# Patient Record
Sex: FEMALE | Race: WHITE | Employment: OTHER | URBAN - METROPOLITAN AREA
[De-identification: names, ages, dates, MRNs, and addresses within clinical notes are randomized per-mention and may not be internally consistent; named-entity substitution may affect disease eponyms.]

---

## 2017-02-16 ENCOUNTER — OFFICE VISIT (OUTPATIENT)
Dept: OBGYN | Age: 72
End: 2017-02-16

## 2017-02-16 VITALS
SYSTOLIC BLOOD PRESSURE: 130 MMHG | DIASTOLIC BLOOD PRESSURE: 62 MMHG | HEIGHT: 64 IN | WEIGHT: 127 LBS | BODY MASS INDEX: 21.68 KG/M2

## 2017-02-16 DIAGNOSIS — Z46.89 PESSARY MAINTENANCE: Primary | ICD-10-CM

## 2017-02-16 PROCEDURE — 1090F PRES/ABSN URINE INCON ASSESS: CPT | Performed by: OBSTETRICS & GYNECOLOGY

## 2017-02-16 PROCEDURE — G8484 FLU IMMUNIZE NO ADMIN: HCPCS | Performed by: OBSTETRICS & GYNECOLOGY

## 2017-02-16 PROCEDURE — 3014F SCREEN MAMMO DOC REV: CPT | Performed by: OBSTETRICS & GYNECOLOGY

## 2017-02-16 PROCEDURE — 4040F PNEUMOC VAC/ADMIN/RCVD: CPT | Performed by: OBSTETRICS & GYNECOLOGY

## 2017-02-16 PROCEDURE — G8419 CALC BMI OUT NRM PARAM NOF/U: HCPCS | Performed by: OBSTETRICS & GYNECOLOGY

## 2017-02-16 PROCEDURE — 1123F ACP DISCUSS/DSCN MKR DOCD: CPT | Performed by: OBSTETRICS & GYNECOLOGY

## 2017-02-16 PROCEDURE — 3017F COLORECTAL CA SCREEN DOC REV: CPT | Performed by: OBSTETRICS & GYNECOLOGY

## 2017-02-16 PROCEDURE — G8400 PT W/DXA NO RESULTS DOC: HCPCS | Performed by: OBSTETRICS & GYNECOLOGY

## 2017-02-16 PROCEDURE — 4004F PT TOBACCO SCREEN RCVD TLK: CPT | Performed by: OBSTETRICS & GYNECOLOGY

## 2017-02-16 PROCEDURE — G8427 DOCREV CUR MEDS BY ELIG CLIN: HCPCS | Performed by: OBSTETRICS & GYNECOLOGY

## 2017-02-16 PROCEDURE — 99213 OFFICE O/P EST LOW 20 MIN: CPT | Performed by: OBSTETRICS & GYNECOLOGY

## 2017-02-20 ASSESSMENT — ENCOUNTER SYMPTOMS
DIARRHEA: 0
ABDOMINAL PAIN: 0
VOMITING: 0
BLOOD IN STOOL: 0
ANAL BLEEDING: 0
RESPIRATORY NEGATIVE: 1
CONSTIPATION: 0
ALLERGIC/IMMUNOLOGIC NEGATIVE: 1
NAUSEA: 0
ABDOMINAL DISTENTION: 0
RECTAL PAIN: 0
EYES NEGATIVE: 1

## 2017-06-15 ENCOUNTER — OFFICE VISIT (OUTPATIENT)
Dept: OBGYN | Age: 72
End: 2017-06-15

## 2017-06-15 VITALS
DIASTOLIC BLOOD PRESSURE: 62 MMHG | SYSTOLIC BLOOD PRESSURE: 138 MMHG | HEIGHT: 64 IN | WEIGHT: 132 LBS | BODY MASS INDEX: 22.53 KG/M2

## 2017-06-15 DIAGNOSIS — Z46.89 PESSARY MAINTENANCE: Primary | ICD-10-CM

## 2017-06-15 PROCEDURE — 4040F PNEUMOC VAC/ADMIN/RCVD: CPT | Performed by: OBSTETRICS & GYNECOLOGY

## 2017-06-15 PROCEDURE — G8420 CALC BMI NORM PARAMETERS: HCPCS | Performed by: OBSTETRICS & GYNECOLOGY

## 2017-06-15 PROCEDURE — G8427 DOCREV CUR MEDS BY ELIG CLIN: HCPCS | Performed by: OBSTETRICS & GYNECOLOGY

## 2017-06-15 PROCEDURE — 1090F PRES/ABSN URINE INCON ASSESS: CPT | Performed by: OBSTETRICS & GYNECOLOGY

## 2017-06-15 PROCEDURE — 4004F PT TOBACCO SCREEN RCVD TLK: CPT | Performed by: OBSTETRICS & GYNECOLOGY

## 2017-06-15 PROCEDURE — G8400 PT W/DXA NO RESULTS DOC: HCPCS | Performed by: OBSTETRICS & GYNECOLOGY

## 2017-06-15 PROCEDURE — 99213 OFFICE O/P EST LOW 20 MIN: CPT | Performed by: OBSTETRICS & GYNECOLOGY

## 2017-06-15 PROCEDURE — 3017F COLORECTAL CA SCREEN DOC REV: CPT | Performed by: OBSTETRICS & GYNECOLOGY

## 2017-06-15 PROCEDURE — 3014F SCREEN MAMMO DOC REV: CPT | Performed by: OBSTETRICS & GYNECOLOGY

## 2017-06-15 PROCEDURE — 1123F ACP DISCUSS/DSCN MKR DOCD: CPT | Performed by: OBSTETRICS & GYNECOLOGY

## 2017-06-15 ASSESSMENT — ENCOUNTER SYMPTOMS
NAUSEA: 0
CONSTIPATION: 0
ABDOMINAL DISTENTION: 0
ANAL BLEEDING: 0
RESPIRATORY NEGATIVE: 1
ALLERGIC/IMMUNOLOGIC NEGATIVE: 1
VOMITING: 0
EYES NEGATIVE: 1
RECTAL PAIN: 0
DIARRHEA: 0
ABDOMINAL PAIN: 0
BLOOD IN STOOL: 0

## 2017-07-20 ENCOUNTER — OFFICE VISIT (OUTPATIENT)
Dept: OBGYN | Age: 72
End: 2017-07-20

## 2017-07-20 VITALS
WEIGHT: 134 LBS | DIASTOLIC BLOOD PRESSURE: 74 MMHG | BODY MASS INDEX: 22.88 KG/M2 | HEIGHT: 64 IN | SYSTOLIC BLOOD PRESSURE: 126 MMHG

## 2017-07-20 DIAGNOSIS — Z12.31 SCREENING MAMMOGRAM, ENCOUNTER FOR: ICD-10-CM

## 2017-07-20 DIAGNOSIS — Z11.51 SPECIAL SCREENING EXAMINATION FOR HUMAN PAPILLOMAVIRUS (HPV): ICD-10-CM

## 2017-07-20 DIAGNOSIS — Z78.0 POSTMENOPAUSAL: ICD-10-CM

## 2017-07-20 DIAGNOSIS — Z01.419 WELL WOMAN EXAM: Primary | ICD-10-CM

## 2017-07-20 PROCEDURE — G0101 CA SCREEN;PELVIC/BREAST EXAM: HCPCS | Performed by: OBSTETRICS & GYNECOLOGY

## 2017-07-20 ASSESSMENT — ENCOUNTER SYMPTOMS
DIARRHEA: 0
ABDOMINAL PAIN: 0
CONSTIPATION: 0
RESPIRATORY NEGATIVE: 1
EYES NEGATIVE: 1
NAUSEA: 0
RECTAL PAIN: 0
BLOOD IN STOOL: 0
VOMITING: 0
ABDOMINAL DISTENTION: 0
ALLERGIC/IMMUNOLOGIC NEGATIVE: 1
ANAL BLEEDING: 0

## 2017-07-21 LAB — PAP SMEAR: NORMAL

## 2017-07-31 DIAGNOSIS — Z11.51 SPECIAL SCREENING EXAMINATION FOR HUMAN PAPILLOMAVIRUS (HPV): ICD-10-CM

## 2017-07-31 DIAGNOSIS — Z01.419 WELL WOMAN EXAM: ICD-10-CM

## 2018-05-22 RX ORDER — CONJUGATED ESTROGENS 0.62 MG/G
0.5 CREAM VAGINAL
Qty: 30 G | Refills: 1 | Status: SHIPPED | OUTPATIENT
Start: 2018-05-24

## 2018-07-24 ENCOUNTER — TELEPHONE (OUTPATIENT)
Dept: OBGYN CLINIC | Age: 73
End: 2018-07-24

## 2020-12-25 ENCOUNTER — APPOINTMENT (OUTPATIENT)
Dept: GENERAL RADIOLOGY | Age: 75
End: 2020-12-25
Payer: MEDICARE

## 2020-12-25 ENCOUNTER — APPOINTMENT (OUTPATIENT)
Dept: CT IMAGING | Age: 75
End: 2020-12-25
Payer: MEDICARE

## 2020-12-25 ENCOUNTER — HOSPITAL ENCOUNTER (EMERGENCY)
Age: 75
Discharge: HOME | End: 2020-12-25
Attending: EMERGENCY MEDICINE
Payer: MEDICARE

## 2020-12-25 VITALS
BODY MASS INDEX: 20.49 KG/M2 | WEIGHT: 120 LBS | HEART RATE: 84 BPM | SYSTOLIC BLOOD PRESSURE: 172 MMHG | HEIGHT: 64 IN | DIASTOLIC BLOOD PRESSURE: 90 MMHG | OXYGEN SATURATION: 96 % | TEMPERATURE: 98.5 F | RESPIRATION RATE: 18 BRPM

## 2020-12-25 LAB
BACTERIA: ABNORMAL /HPF
BILIRUBIN URINE: NEGATIVE
BLOOD, URINE: ABNORMAL
CLARITY: ABNORMAL
COLOR: YELLOW
EPITHELIAL CELLS, UA: ABNORMAL /HPF
GLUCOSE URINE: NEGATIVE MG/DL
KETONES, URINE: 15 MG/DL
LEUKOCYTE ESTERASE, URINE: ABNORMAL
NITRITE, URINE: POSITIVE
PH UA: 6 (ref 5–9)
PROTEIN UA: 30 MG/DL
RENAL EPITHELIAL, UA: ABNORMAL /HPF
SPECIFIC GRAVITY UA: >=1.03 (ref 1–1.03)
URINE REFLEX TO CULTURE: YES
UROBILINOGEN, URINE: 0.2 E.U./DL
WBC UA: ABNORMAL /HPF (ref 0–5)

## 2020-12-25 PROCEDURE — 73502 X-RAY EXAM HIP UNI 2-3 VIEWS: CPT

## 2020-12-25 PROCEDURE — 87077 CULTURE AEROBIC IDENTIFY: CPT

## 2020-12-25 PROCEDURE — 99285 EMERGENCY DEPT VISIT HI MDM: CPT

## 2020-12-25 PROCEDURE — 87186 SC STD MICRODIL/AGAR DIL: CPT

## 2020-12-25 PROCEDURE — 6370000000 HC RX 637 (ALT 250 FOR IP): Performed by: EMERGENCY MEDICINE

## 2020-12-25 PROCEDURE — 81001 URINALYSIS AUTO W/SCOPE: CPT

## 2020-12-25 PROCEDURE — 87086 URINE CULTURE/COLONY COUNT: CPT

## 2020-12-25 PROCEDURE — 70450 CT HEAD/BRAIN W/O DYE: CPT

## 2020-12-25 RX ORDER — NITROFURANTOIN 25; 75 MG/1; MG/1
100 CAPSULE ORAL ONCE
Status: COMPLETED | OUTPATIENT
Start: 2020-12-25 | End: 2020-12-25

## 2020-12-25 RX ORDER — NITROFURANTOIN 25; 75 MG/1; MG/1
100 CAPSULE ORAL 2 TIMES DAILY
Qty: 20 CAPSULE | Refills: 0 | Status: SHIPPED | OUTPATIENT
Start: 2020-12-25 | End: 2021-01-04

## 2020-12-25 RX ORDER — CLONIDINE HYDROCHLORIDE 0.1 MG/1
0.1 TABLET ORAL ONCE
Status: COMPLETED | OUTPATIENT
Start: 2020-12-25 | End: 2020-12-25

## 2020-12-25 RX ORDER — CLONIDINE HYDROCHLORIDE 0.1 MG/1
0.1 TABLET ORAL ONCE
Status: DISCONTINUED | OUTPATIENT
Start: 2020-12-25 | End: 2020-12-25

## 2020-12-25 RX ADMIN — CLONIDINE HYDROCHLORIDE 0.1 MG: 0.1 TABLET ORAL at 14:16

## 2020-12-25 RX ADMIN — NITROFURANTOIN (MONOHYDRATE/MACROCRYSTALS) 100 MG: 75; 25 CAPSULE ORAL at 14:57

## 2020-12-25 ASSESSMENT — ENCOUNTER SYMPTOMS
CHOKING: 0
SINUS PRESSURE: 0
SORE THROAT: 0
EYE PAIN: 0
CONSTIPATION: 0
EYE DISCHARGE: 0
VOMITING: 0
STRIDOR: 0
TROUBLE SWALLOWING: 0
ABDOMINAL PAIN: 0
FACIAL SWELLING: 0
COUGH: 0
EYE REDNESS: 0
CHEST TIGHTNESS: 0
VOICE CHANGE: 0
WHEEZING: 0
DIARRHEA: 0
BACK PAIN: 0
SHORTNESS OF BREATH: 0
BLOOD IN STOOL: 0

## 2020-12-25 NOTE — ED PROVIDER NOTES
[facility] \[facility]\"" ED  eMERGENCY dEPARTMENT eNCOUnter      Pt Name: Bello Dubois  MRN: [id number]  [place] 1945  Date of evaluation: 12/25/2020  Provider: MD Darren Valdez       Chief Complaint   Patient presents with    Leg Pain     Left leg. Laura Gentile in bathroom last night, found in bathroom with left leg behind her. HISTORY OF PRESENT ILLNESS   (Location/Symptom, Timing/Onset,Context/Setting, Quality, Duration, Modifying Factors, Severity)  Note limiting factors. Bello Dubois is a 77 y.o. female who presents to the emergency department patient status post remote stroke with left-sided weakness but able to recover and walk with walker as per family members history of anxiety depression COPD hypertension hypercholesterolemia arthritis lives by herself as per daughter she found in the bathroom for how long she was there but not sure also noted to be slightly confused for the last few days time as per daughter no bleeding patient denies any pain anywhere patient is brought here by the ambulance for further evaluation of the hip injury is unable to get up by herself no fever no chills no chest pain no short of breath, patient denies any headache nausea vomiting as per daughter probably has not taken her blood pressure medication today    HPI    NursingNotes were reviewed. REVIEW OF SYSTEMS    (2-9 systems for level 4, 10 or more for level 5)     Review of Systems   Constitutional: Negative. Negative for activity change and fever. HENT: Negative for congestion, drooling, facial swelling, mouth sores, nosebleeds, sinus pressure, sore throat, trouble swallowing and voice change. Eyes: Negative for pain, discharge, redness and visual disturbance. Respiratory: Negative for cough, choking, chest tightness, shortness of breath, wheezing and stridor. Cardiovascular: Negative for chest pain, palpitations and leg swelling.    Gastrointestinal: Negative for abdominal pain, blood in stool, constipation, diarrhea and vomiting. Endocrine: Negative for cold intolerance, polyphagia and polyuria. Genitourinary: Negative for dysuria, flank pain, frequency, genital sores and urgency. Musculoskeletal: Positive for arthralgias, gait problem and myalgias. Negative for back pain, joint swelling, neck pain and neck stiffness. Skin: Negative for pallor and rash. Neurological: Negative for tremors, seizures, syncope, weakness, numbness and headaches. Hematological: Negative for adenopathy. Does not bruise/bleed easily. Psychiatric/Behavioral: Positive for confusion. Negative for agitation, behavioral problems, hallucinations and sleep disturbance. The patient is not hyperactive. All other systems reviewed and are negative. Except as noted above the remainder of the review of systems was reviewed and negative.        PAST MEDICAL HISTORY     Past Medical History:   Diagnosis Date    Depression     Diverticul disease small and large intestine, no perforati or abscess     Hypercholesterolemia     Hypertension     Osteoarthritis     Psychiatric problem     Unspecified breast disorder     Uterine prolapse          SURGICALHISTORY       Past Surgical History:   Procedure Laterality Date    ABDOMINAL EXPLORATION SURGERY      Pain/ ovarian cysts    APPENDECTOMY      BREAST BIOPSY Left     CHOLECYSTECTOMY      FOREIGN BODY REMOVAL  09/12/2017    DR. Renae Monas    TONSILLECTOMY           CURRENT MEDICATIONS       Discharge Medication List as of 12/25/2020  3:16 PM      CONTINUE these medications which have NOT CHANGED    Details   PREMARIN 0.625 MG/GM vaginal cream Place 0.5 g vaginally twice a week, Vaginal, TWICE WEEKLY Starting Thu 5/24/2018, Disp-30 g, R-1, Normal      tamoxifen (NOLVADEX) 20 MG tablet R-2      Cholecalciferol (VITAMIN D3) 2000 UNITS CAPS Take by mouth      amLODIPine-benazepril (LOTREL) 10-20 MG per capsule Historical Med      predniSONE (Ebbie Teto) 10 MG tablet Historical Med      ADVAIR DISKUS 100-50 MCG/DOSE diskus inhaler Historical Med      buPROPion (WELLBUTRIN SR) 150 MG SR tablet Historical Med      PROVENTIL  (90 BASE) MCG/ACT inhaler Historical Med      atenolol (TENORMIN) 100 MG tablet Historical Med      BUPROPION HCL by Does not apply route. AmLODIPine Besylate (NORVASC PO) Take  by mouth. Aspirin (ECOTRIN PO) Take  by mouth. Albuterol (PROVENTIL IN) Inhale  into the lungs. ALLERGIES     Levofloxacin, Pcn [penicillins], and Trilipix [choline fenofibrate]    FAMILY HISTORY       Family History   Problem Relation Age of Onset    High Blood Pressure Mother     Stroke Mother     Lung Cancer Brother     Breast Cancer Neg Hx     Cancer Neg Hx     Colon Cancer Neg Hx     Diabetes Neg Hx     Eclampsia Neg Hx     Hypertension Neg Hx     Ovarian Cancer Neg Hx      Labor Neg Hx     Spont Abortions Neg Hx           SOCIAL HISTORY       Social History     Socioeconomic History    Marital status:       Spouse name: None    Number of children: None    Years of education: None    Highest education level: None   Occupational History    None   Social Needs    Financial resource strain: None    Food insecurity     Worry: None     Inability: None    Transportation needs     Medical: None     Non-medical: None   Tobacco Use    Smoking status: Current Every Day Smoker     Packs/day: 0.50     Years: 50.00     Pack years: 25.00    Smokeless tobacco: Never Used   Substance and Sexual Activity    Alcohol use: Yes     Comment: social    Drug use: No    Sexual activity: Yes     Partners: Male   Lifestyle    Physical activity     Days per week: None     Minutes per session: None    Stress: None   Relationships    Social connections     Talks on phone: None     Gets together: None     Attends Evangelical service: None     Active member of club or organization: None     Attends meetings of clubs or Pulmonary:      Effort: No respiratory distress. Breath sounds: Normal breath sounds. No stridor. No wheezing or rhonchi. Chest:      Chest wall: No tenderness. Abdominal:      General: Bowel sounds are normal. There is no distension. Palpations: Abdomen is soft. There is no mass. Tenderness: There is no abdominal tenderness. There is no right CVA tenderness, guarding or rebound. Hernia: No hernia is present. Musculoskeletal: Normal range of motion. General: No tenderness or deformity. Right lower leg: No edema. Comments: Patient given to the both extremities patient has no shortening or deformity of the leg patient had no hematoma no tenderness elicited on my examination at this time   Lymphadenopathy:      Cervical: No cervical adenopathy. Skin:     General: Skin is warm. Capillary Refill: Capillary refill takes less than 2 seconds. Coloration: Skin is not jaundiced. Findings: No bruising, erythema or rash. Neurological:      General: No focal deficit present. Mental Status: She is alert and oriented to person, place, and time. Cranial Nerves: No cranial nerve deficit. Sensory: No sensory deficit. Motor: No weakness or abnormal muscle tone. Coordination: Coordination normal.      Gait: Gait normal.      Deep Tendon Reflexes: Reflexes normal.   Psychiatric:         Behavior: Behavior normal.         Thought Content:  Thought content normal.         DIAGNOSTIC RESULTS     EKG: All EKG's are interpreted by the Emergency Department Physician who either signs or Co-signsthis chart in the absence of a cardiologist.        RADIOLOGY:   Non-plain filmimages such as CT, Ultrasound and MRI are read by the radiologist. Plain radiographic images are visualized and preliminarily interpreted by the emergency physician with the below findings:        Interpretation per the Radiologist below, if available at the time ofthis note:    XR HIP 2-3 VW W PELVIS LEFT   Final Result      No acute findings. CT Head WO Contrast   Final Result      Significant limitations from patient motion. Within these limits, no acute intracranial process. ED BEDSIDE ULTRASOUND:   Performed by ED Physician - none    LABS:  Labs Reviewed   URINE RT REFLEX TO CULTURE - Abnormal; Notable for the following components:       Result Value    Ketones, Urine 15 (*)     Protein, UA 30 (*)     All other components within normal limits   MICROSCOPIC URINALYSIS - Abnormal; Notable for the following components:    Bacteria, UA MANY (*)     All other components within normal limits   CULTURE, URINE       All other labs were within normal range or not returned as of this dictation.     EMERGENCY DEPARTMENT COURSE and DIFFERENTIAL DIAGNOSIS/MDM:   Vitals:    Vitals:    12/25/20 1349 12/25/20 1416 12/25/20 1502   BP: (!) 230/108 (!) 180/100 (!) 172/90   Pulse: 86  84   Resp: 18  18   Temp: 98.5 °F (36.9 °C)     TempSrc: Oral     SpO2: 96%  96%   Weight: 120 lb (54.4 kg)     Height: 5' 4\" (1.626 m)           MDM  Number of Diagnoses or Management Options  Acute cystitis without hematuria  Contusion of left lower extremity, initial encounter: established and improving  H/O fall  Diagnosis management comments: Patient complaining of UTI advised to drink plenty of fluids patient is given blood pressure medication and blood pressure coming down patient had no focal deficit moving all extremities very well patient given lunch and she eating lunch at this time no acute distress denies any pain anywhere x-rays of the pelvis performed no fracture CT of the head performed patient has no acute new stroke at this time as per radiologist no bleeding family informed about situation drinking fluid hydration and following precautions family understood very well       Amount and/or Complexity of Data Reviewed  Tests in the radiology section of CPT®: ordered and

## 2020-12-25 NOTE — ED NOTES
Awaiting family member arrival for transport home     [facility]  12/25/20 7615

## 2020-12-25 NOTE — ED TRIAGE NOTES
Pt brought in by EMS. Found on bathroom floor. Unknown how long she has been there. Family reports increased confusion.

## 2020-12-25 NOTE — ED NOTES
Pt found on bathroom floor, unknown how long she had been there, left leg was behind her. . Prior CVA in 2018. Daughter states that pt drags her left leg and uses a walker. Daughter states that pt has had increased confusion today. Hand grasps moderately strong and equal. Plantar flexion and dorsiflexion stronger on right compared to left. Hypertensive. Pt admits to not taking her medications properly. 180/100 manually. Denies headache. Denies neck/back pain.       Carlton Wang, RN  12/25/20 3193

## 2020-12-28 LAB
ORGANISM: ABNORMAL
URINE CULTURE, ROUTINE: ABNORMAL

## 2023-03-27 PROBLEM — N84.0 ENDOMETRIAL POLYP: Status: ACTIVE | Noted: 2023-03-27

## 2023-03-27 PROBLEM — F32.A ANXIETY AND DEPRESSION: Status: ACTIVE | Noted: 2023-03-27

## 2023-03-27 PROBLEM — R93.89 THICKENED ENDOMETRIUM: Status: ACTIVE | Noted: 2023-03-27

## 2023-03-27 PROBLEM — R53.1 WEAKNESS: Status: ACTIVE | Noted: 2023-03-27

## 2023-03-27 PROBLEM — E78.5 HLD (HYPERLIPIDEMIA): Status: ACTIVE | Noted: 2023-03-27

## 2023-03-27 PROBLEM — K44.9 HIATAL HERNIA: Status: ACTIVE | Noted: 2023-03-27

## 2023-03-27 PROBLEM — I25.10 CAD, MULTIPLE VESSEL: Status: ACTIVE | Noted: 2023-03-27

## 2023-03-27 PROBLEM — F41.9 ANXIETY AND DEPRESSION: Status: ACTIVE | Noted: 2023-03-27

## 2023-03-27 PROBLEM — K21.9 GERD (GASTROESOPHAGEAL REFLUX DISEASE): Status: ACTIVE | Noted: 2023-03-27

## 2023-03-27 RX ORDER — TAMOXIFEN CITRATE 20 MG/1
1 TABLET ORAL DAILY
COMMUNITY
Start: 2015-11-29

## 2023-03-27 RX ORDER — ALBUTEROL SULFATE 90 UG/1
AEROSOL, METERED RESPIRATORY (INHALATION)
COMMUNITY

## 2023-03-27 RX ORDER — TRAZODONE HYDROCHLORIDE 150 MG/1
TABLET ORAL
COMMUNITY
Start: 2022-11-21

## 2023-03-27 RX ORDER — CHOLECALCIFEROL (VITAMIN D3) 25 MCG
1 TABLET ORAL DAILY
COMMUNITY

## 2023-03-27 RX ORDER — NITROFURANTOIN 25; 75 MG/1; MG/1
CAPSULE ORAL
COMMUNITY
Start: 2022-12-21

## 2023-03-27 RX ORDER — ATENOLOL 50 MG/1
1 TABLET ORAL DAILY
COMMUNITY

## 2023-03-27 RX ORDER — ATENOLOL 50 MG/1
50 TABLET ORAL
COMMUNITY
Start: 2009-03-27

## 2023-03-27 RX ORDER — ALBUTEROL SULFATE 90 UG/1
2 AEROSOL, METERED RESPIRATORY (INHALATION)
COMMUNITY
Start: 2013-09-10

## 2023-03-27 RX ORDER — AZITHROMYCIN 250 MG/1
TABLET, FILM COATED ORAL
COMMUNITY
Start: 2017-09-01

## 2023-03-27 RX ORDER — ACETAMINOPHEN 500 MG
TABLET ORAL
COMMUNITY

## 2023-03-27 RX ORDER — TALC
POWDER (GRAM) TOPICAL
COMMUNITY

## 2023-03-27 RX ORDER — ONDANSETRON 4 MG/1
TABLET, FILM COATED ORAL
COMMUNITY
Start: 2022-12-21

## 2023-03-27 RX ORDER — PANTOPRAZOLE SODIUM 40 MG/1
TABLET, DELAYED RELEASE ORAL
COMMUNITY
Start: 2022-05-25

## 2023-03-27 RX ORDER — FAMOTIDINE 20 MG/1
20 TABLET, FILM COATED ORAL
COMMUNITY
Start: 2009-03-27

## 2023-03-27 RX ORDER — MIRTAZAPINE 15 MG/1
TABLET, FILM COATED ORAL
COMMUNITY
Start: 2022-12-21

## 2023-03-27 RX ORDER — AMLODIPINE AND BENAZEPRIL HYDROCHLORIDE 10; 20 MG/1; MG/1
1 CAPSULE ORAL
COMMUNITY
Start: 2014-06-16

## 2023-03-27 RX ORDER — VALSARTAN AND HYDROCHLOROTHIAZIDE 160; 12.5 MG/1; MG/1
1 TABLET, FILM COATED ORAL
COMMUNITY
Start: 2009-03-27

## 2023-03-27 RX ORDER — ASPIRIN 325 MG
325 TABLET, DELAYED RELEASE (ENTERIC COATED) ORAL
COMMUNITY

## 2023-03-27 RX ORDER — ATORVASTATIN CALCIUM 40 MG/1
1 TABLET, FILM COATED ORAL DAILY
COMMUNITY
Start: 2021-01-02

## 2023-03-27 RX ORDER — BUPROPION HYDROCHLORIDE 150 MG/1
150 TABLET, EXTENDED RELEASE ORAL 2 TIMES DAILY
COMMUNITY
Start: 2013-07-05

## 2023-03-27 RX ORDER — FLUTICASONE PROPIONATE AND SALMETEROL 100; 50 UG/1; UG/1
1 POWDER RESPIRATORY (INHALATION) 2 TIMES DAILY
COMMUNITY
Start: 2013-06-15

## 2023-03-27 RX ORDER — ALBUTEROL SULFATE 0.83 MG/ML
SOLUTION RESPIRATORY (INHALATION)
COMMUNITY
Start: 2023-01-16

## 2023-03-27 RX ORDER — TRAZODONE HYDROCHLORIDE 100 MG/1
TABLET ORAL
COMMUNITY
Start: 2022-12-21

## 2023-03-27 RX ORDER — SUCRALFATE 1 G/1
TABLET ORAL
COMMUNITY
Start: 2023-02-19

## 2023-03-27 RX ORDER — NAPROXEN SODIUM 220 MG/1
1 TABLET, FILM COATED ORAL DAILY
COMMUNITY

## 2023-03-27 RX ORDER — BUPROPION HYDROCHLORIDE 100 MG/1
TABLET, EXTENDED RELEASE ORAL
COMMUNITY
Start: 2022-12-21

## 2023-03-27 RX ORDER — CLOPIDOGREL BISULFATE 75 MG/1
1 TABLET ORAL DAILY
COMMUNITY
Start: 2019-07-27

## 2023-03-27 RX ORDER — TAMOXIFEN CITRATE 20 MG/1
20 TABLET ORAL
COMMUNITY
Start: 2017-07-28

## 2023-03-27 RX ORDER — LIDOCAINE 50 MG/G
PATCH TOPICAL
COMMUNITY
Start: 2022-06-07

## 2023-03-27 RX ORDER — FENOFIBRATE 145 MG/1
145 TABLET, FILM COATED ORAL
COMMUNITY
Start: 2009-03-27

## 2023-03-27 RX ORDER — TRAZODONE HYDROCHLORIDE 50 MG/1
TABLET ORAL
COMMUNITY
Start: 2022-09-22

## 2023-03-27 RX ORDER — ATENOLOL 100 MG/1
100 TABLET ORAL
COMMUNITY
Start: 2013-02-28

## 2023-03-27 RX ORDER — DONEPEZIL HYDROCHLORIDE 5 MG/1
TABLET, FILM COATED ORAL
COMMUNITY
Start: 2022-11-21

## 2023-03-27 RX ORDER — AMLODIPINE BESYLATE 100 %
POWDER (GRAM) MISCELLANEOUS
COMMUNITY

## 2023-03-27 RX ORDER — ACETAMINOPHEN 500 MG
TABLET ORAL EVERY 8 HOURS PRN
COMMUNITY
Start: 2021-08-08

## 2023-03-27 RX ORDER — PREDNISONE 10 MG/1
TABLET ORAL
COMMUNITY
Start: 2014-06-03

## 2023-03-27 RX ORDER — ONDANSETRON 4 MG/1
TABLET, ORALLY DISINTEGRATING ORAL EVERY 8 HOURS PRN
COMMUNITY
Start: 2021-08-08

## 2023-04-18 ENCOUNTER — NURSING HOME VISIT (OUTPATIENT)
Dept: POST ACUTE CARE | Facility: EXTERNAL LOCATION | Age: 78
End: 2023-04-18
Payer: MEDICARE

## 2023-04-18 VITALS
HEIGHT: 64 IN | DIASTOLIC BLOOD PRESSURE: 90 MMHG | HEART RATE: 97 BPM | BODY MASS INDEX: 20.35 KG/M2 | TEMPERATURE: 98.2 F | SYSTOLIC BLOOD PRESSURE: 153 MMHG | WEIGHT: 119.2 LBS | RESPIRATION RATE: 20 BRPM

## 2023-04-18 DIAGNOSIS — F41.9 ANXIETY AND DEPRESSION: ICD-10-CM

## 2023-04-18 DIAGNOSIS — I25.10 CAD, MULTIPLE VESSEL: ICD-10-CM

## 2023-04-18 DIAGNOSIS — I10 HTN (HYPERTENSION), BENIGN: Primary | ICD-10-CM

## 2023-04-18 DIAGNOSIS — Z51.5 HOSPICE CARE PATIENT: ICD-10-CM

## 2023-04-18 DIAGNOSIS — F32.A ANXIETY AND DEPRESSION: ICD-10-CM

## 2023-04-18 DIAGNOSIS — M19.90 OSTEOARTHRITIS, UNSPECIFIED OSTEOARTHRITIS TYPE, UNSPECIFIED SITE: ICD-10-CM

## 2023-04-18 PROCEDURE — 99308 SBSQ NF CARE LOW MDM 20: CPT | Performed by: NURSE PRACTITIONER

## 2023-04-22 PROBLEM — Z51.5 HOSPICE CARE PATIENT: Status: ACTIVE | Noted: 2023-04-22

## 2024-01-16 ENCOUNTER — NURSING HOME VISIT (OUTPATIENT)
Dept: POST ACUTE CARE | Facility: EXTERNAL LOCATION | Age: 79
End: 2024-01-16
Payer: MEDICARE

## 2024-01-16 DIAGNOSIS — J06.9 UPPER RESPIRATORY INFECTION WITH COUGH AND CONGESTION: Primary | ICD-10-CM

## 2024-01-16 DIAGNOSIS — F01.53: ICD-10-CM

## 2024-01-16 DIAGNOSIS — J44.9 CHRONIC OBSTRUCTIVE PULMONARY DISEASE, UNSPECIFIED COPD TYPE (MULTI): ICD-10-CM

## 2024-01-16 PROCEDURE — 99309 SBSQ NF CARE MODERATE MDM 30: CPT | Performed by: NURSE PRACTITIONER

## 2024-01-16 NOTE — LETTER
"Patient: Eli Tay  : 1945    Encounter Date: 2024    Name: Eli Tay  YOB: 1945    ACUTE VISIT: LTC, fever, cough    SUBJECTIVE:  Nursing reported that Eli is not feeling well - fever and cough. Patient is resting in bed and opens eyes when name is called. She does not appear to be in acute distress but does appear to fatigue and generally not feeling well. Patient c/o cough and feeling tired. Covid 19 test done and negative. Will have nursing obtain Flu and RSV swab. Unfort, these tests need to be sent out. CXR to be ordered as patient has Rhonchi bilaterally. Patient does have low grade fever of 99.0    REVIEW OF SYSTEMS:   All review of systems are negative unless otherwise stated above under subjective.    LABS REVIEWED AT FACILITY:  CXR 24 - Negative  Flu and RSV sent out    MEDICATIONS REVIEWED AT FACILITY:  Zpak as directed  Prednisone 40 mg daily x 5 days  Albuterol nebs Q6hr PRN  Tums as needed  Imodium as needed  Lidocaine patch to bilateral hips and lower back as needed  Tylenol 650 mg every 4 hours as needed  Colace 1 tab twice daily as needed  Trazodone 125 mg nightly  Melatonin 10 mg nightly    Living will related issues reviewed-[facility]    OBJECTIVE:  /84   Pulse 96   Temp 37.1 °C (98.8 °F)   Resp 18   Ht 1.625 m (5' 3.97\")   Wt 58.9 kg (129 lb 12.8 oz)   SpO2 96%   BMI 22.30 kg/m²     Physical Exam  Constitutional:       Comments: Chronically ill, fatigue, pale  HENT:      Head: Normocephalic.      Mouth/Throat:      Mouth: Mucous membranes are moist.   Eyes:      Pupils: Pupils are equal, round, and reactive to light.   Cardiovascular:      Rate and Rhythm: Normal rate and regular rhythm.   Pulmonary:      Effort: Pulmonary effort is normal.      Breath sounds: Rhonchi bilateral  Abdominal:      General: Abdomen is flat. Bowel sounds are normal.      Palpations: Abdomen is soft.   Genitourinary:     Comments: Genitalia " not examined  Skin:     General: Skin is warm and dry.   Neurological:      General: No focal deficit present.      Mental Status: She is alert.      Comments: Oriented x1   Psychiatric:         Mood and Affect: Mood normal.      Assessment/Plan  Problem List Items Addressed This Visit       Vascular dementia with depressed mood (CMS/HCC)    Chronic obstructive pulmonary disease (CMS/HCC)    Upper respiratory infection with cough and congestion - Primary       Skin integrity:  Nursing to monitor skin integrity as patient is at risk for pressure injuries.  Please monitor skin integrity and other pressure areas  Referral to wound clinic if appropriate:    PLAN:  Pt has been seen for an Acute visit.  The patient resides in a Long term care facility.  Patient was formerly in Hospice but no longer meeting criteria. She is in the locked Dementia unit.  Recent nursing evaluation and notes were reviewed.   Overall, the patient is experiencing the following:  #Upper resp illness, h/o COPD: ?Flu ?RSV, Covid negative, CXR negative, Zpak, Prednisone and Albuterol nebs ordered. Tylenol as needed for fever.  Psych adjusted meds (trazodone) for depression as well as Melatonin for insomnia.   #Dementia: redirect as needed   Any decline or change in condition needs to be communicated with the physician or myself.    Discussion with nursing staff regarding ongoing care and management.  If needed, would communicate with family who are not present at this time.   There are no concerns at this time.  We will continue with the medications noted above.    We will continue to follow the patient here at the facility.    Discharge planning: no plan for discharge, LTC resident, rehab potential    *Please note that nursing facility, outside laboratory agency, and  [facility] do not interface.     Completion of the note was done through Dragon voice recognition technology and may include   unintended or grammatical errors which may not have been  recognized when finalizing the note.     Time:    MORE Madison      Electronically Signed By: MORE Madison   1/23/24  7:14 PM

## 2024-01-22 VITALS
TEMPERATURE: 98.8 F | BODY MASS INDEX: 22.16 KG/M2 | OXYGEN SATURATION: 96 % | DIASTOLIC BLOOD PRESSURE: 84 MMHG | HEIGHT: 64 IN | SYSTOLIC BLOOD PRESSURE: 107 MMHG | WEIGHT: 129.8 LBS | RESPIRATION RATE: 18 BRPM | HEART RATE: 96 BPM

## 2024-01-22 NOTE — PROGRESS NOTES
"Name: Eli Tay  YOB: 1945    ACUTE VISIT: LTC, fever, cough    SUBJECTIVE:  Nursing reported that Eli is not feeling well - fever and cough. Patient is resting in bed and opens eyes when name is called. She does not appear to be in acute distress but does appear to fatigue and generally not feeling well. Patient c/o cough and feeling tired. Covid 19 test done and negative. Will have nursing obtain Flu and RSV swab. Unfort, these tests need to be sent out. CXR to be ordered as patient has Rhonchi bilaterally. Patient does have low grade fever of 99.0    REVIEW OF SYSTEMS:   All review of systems are negative unless otherwise stated above under subjective.    LABS REVIEWED AT FACILITY:  CXR 1/16/24 - Negative  Flu and RSV sent out    MEDICATIONS REVIEWED AT FACILITY:  Zpak as directed  Prednisone 40 mg daily x 5 days  Albuterol nebs Q6hr PRN  Tums as needed  Imodium as needed  Lidocaine patch to bilateral hips and lower back as needed  Tylenol 650 mg every 4 hours as needed  Colace 1 tab twice daily as needed  Trazodone 125 mg nightly  Melatonin 10 mg nightly    [facility] issues [facility]    OBJECTIVE:  /84   Pulse 96   Temp 37.1 °C (98.8 °F)   Resp 18   Ht 1.625 m (5' 3.97\")   Wt 58.9 kg (129 lb 12.8 oz)   SpO2 96%   BMI 22.30 kg/m²     Physical Exam  Constitutional:       Comments: Chronically ill, fatigue, pale  HENT:      Head: Normocephalic.      Mouth/Throat:      Mouth: Mucous membranes are moist.   Eyes:      Pupils: Pupils are equal, round, and reactive to light.   Cardiovascular:      Rate and Rhythm: Normal rate and regular rhythm.   Pulmonary:      Effort: Pulmonary effort is normal.      Breath sounds: Rhonchi bilateral  Abdominal:      General: Abdomen is flat. Bowel sounds are normal.      Palpations: Abdomen is soft.   Genitourinary:     Comments: Genitalia not examined  Skin:     General: Skin is warm and dry.   Neurological:      " General: No focal deficit present.      Mental Status: She is alert.      Comments: Oriented x1   Psychiatric:         Mood and Affect: Mood normal.      Assessment/Plan   Problem List Items Addressed This Visit       Vascular dementia with depressed mood (CMS/HCC)    Chronic obstructive pulmonary disease (CMS/HCC)    Upper respiratory infection with cough and congestion - Primary       Skin integrity:  Nursing to monitor skin integrity as patient is at risk for pressure injuries.  Please monitor skin integrity and other pressure areas  Referral to wound clinic if appropriate:    PLAN:  Pt has been seen for an Acute visit.  The patient resides in a Long term care facility.  Patient was formerly in Hospice but no longer meeting criteria. She is in the locked Dementia unit.  Recent nursing evaluation and notes were reviewed.   Overall, the patient is experiencing the following:  #Upper resp illness, h/o COPD: ?Flu ?RSV, Covid negative, CXR negative, Zpak, Prednisone and Albuterol nebs ordered. Tylenol as needed for fever.  Psych adjusted meds (trazodone) for depression as well as Melatonin for insomnia.   #Dementia: redirect as needed   Any decline or change in condition needs to be communicated with the physician or myself.    Discussion with nursing staff regarding ongoing care and management.  If needed, would communicate with family who are not present at this time.   There are no concerns at this time.  We will continue with the medications noted above.    We will continue to follow the patient here at the facility.    Discharge planning: no plan for discharge, LTC resident, rehab potential    *Please note that nursing facility, outside laboratory agency, and  [facility] do not interface.     Completion of the note was done through Dragon voice recognition technology and may include   unintended or grammatical errors which may not have been recognized when finalizing the note.     Time:    Luisa Rivas,  APRN-CNP

## 2024-01-23 ENCOUNTER — NURSING HOME VISIT (OUTPATIENT)
Dept: POST ACUTE CARE | Facility: EXTERNAL LOCATION | Age: 79
End: 2024-01-23
Payer: MEDICARE

## 2024-01-23 DIAGNOSIS — J06.9 UPPER RESPIRATORY INFECTION WITH COUGH AND CONGESTION: Primary | ICD-10-CM

## 2024-01-23 DIAGNOSIS — F01.53: ICD-10-CM

## 2024-01-23 PROBLEM — Z51.5 HOSPICE CARE PATIENT: Status: RESOLVED | Noted: 2023-04-22 | Resolved: 2024-01-23

## 2024-01-23 PROCEDURE — 99309 SBSQ NF CARE MODERATE MDM 30: CPT | Performed by: NURSE PRACTITIONER

## 2024-01-23 NOTE — LETTER
"Patient: Eli Tay  : 1945    Encounter Date: 2024    Name: Eli Tay  YOB: 1945    Follow up VISIT: LTC, f/up on URI and labs    SUBJECTIVE:  The patient appears to be doing better. She is not having fevers or chills. Per nursing less coughing and appears to be feeling better. Covid, RSV, and Flu negative - resulted on 24. Patient is pleasantly confused and not able to have a meaningful conversation.    REVIEW OF SYSTEMS:   All review of systems are negative unless otherwise stated above under subjective.    LABS REVIEWED AT FACILITY:  1/15/24 sent out sample and resulted on 24  RSV negative  Covid negative  Flu negative    MEDICATIONS REVIEWED AT FACILITY:    Living will related issues reviewed-Code status: [facility]    OBJECTIVE:  /84   Pulse 96   Temp 36.4 °C (97.6 °F)   Resp 18   Ht 1.625 m (5' 3.97\")   Wt 58.9 kg (129 lb 12.8 oz)   SpO2 96%   BMI 22.30 kg/m²     Physical Exam  Constitutional:       Comments: Chronically ill  HENT:      Head: Normocephalic.      Mouth/Throat:      Mouth: Mucous membranes are moist.   Eyes:      Pupils: Pupils are equal, round, and reactive to light.   Cardiovascular:      Rate and Rhythm: Normal rate and regular rhythm.   Pulmonary:      Effort: Pulmonary effort is normal.      Breath sounds: Normal breath sounds  Abdominal:      General: Abdomen is flat. Bowel sounds are normal.      Palpations: Abdomen is soft.   Genitourinary:     Comments: Genitalia not examined  Skin:     General: Skin is warm and dry.   Neurological:      General: No focal deficit present.      Mental Status: She is alert.      Comments: Oriented x1   Psychiatric:         Mood and Affect: Mood normal.     Assessment/Plan  Problem List Items Addressed This Visit       Vascular dementia with depressed mood (CMS/HCC)    Upper respiratory infection with cough and congestion - Primary       Skin integrity:  Nursing to monitor skin integrity as " patient is at risk for pressure injuries.    PLAN:  Pt has been seen for an follow up visit.  The patient resides in a Long term care facility.  Recent nursing evaluation and notes were reviewed.   Overall, the patient is experiencing the following:  #URI: Feeling better, residual cough, ATB and steroids completed.   #Dementia: no behaviors, redirect as needed  Any decline or change in condition needs to be communicated with the physician or myself.    Discussion with nursing staff regarding ongoing care and management.  If needed, would communicate with family who are not present at this time.   There are no concerns at this time.  We will continue with the medications noted above.    We will continue to follow the patient here at the facility.    Discharge planning: no plan for discharge, LTC resident, rehab potential    *Please note that nursing facility, outside laboratory agency, and [facility] do not interface.     Completion of the note was done through Dragon voice recognition technology and may include   unintended or grammatical errors which may not have been recognized when finalizing the note.     Time:    MORE Madison      Electronically Signed By: MORE Madison   2/1/24  1:05 PM

## 2024-01-27 VITALS
TEMPERATURE: 97.6 F | SYSTOLIC BLOOD PRESSURE: 107 MMHG | HEIGHT: 64 IN | HEART RATE: 96 BPM | BODY MASS INDEX: 22.16 KG/M2 | OXYGEN SATURATION: 96 % | DIASTOLIC BLOOD PRESSURE: 84 MMHG | WEIGHT: 129.8 LBS | RESPIRATION RATE: 18 BRPM

## 2024-01-27 NOTE — PROGRESS NOTES
"Name: Eli Tay  YOB: 1945    Follow up VISIT: LTC, f/up on URI and labs    SUBJECTIVE:  The patient appears to be doing better. She is not having fevers or chills. Per nursing less coughing and appears to be feeling better. Covid, RSV, and Flu negative - resulted on 1/20/24. Patient is pleasantly confused and not able to have a meaningful conversation.    REVIEW OF SYSTEMS:   All review of systems are negative unless otherwise stated above under subjective.    LABS REVIEWED AT FACILITY:  1/15/24 sent out sample and resulted on 1/20/24  RSV negative  Covid negative  Flu negative    MEDICATIONS REVIEWED AT FACILITY:    [facility]    OBJECTIVE:  /84   Pulse 96   Temp 36.4 °C (97.6 °F)   Resp 18   Ht 1.625 m (5' 3.97\")   Wt 58.9 kg (129 lb 12.8 oz)   SpO2 96%   BMI 22.30 kg/m²     Physical Exam  Constitutional:       Comments: Chronically ill  HENT:      Head: Normocephalic.      Mouth/Throat:      Mouth: Mucous membranes are moist.   Eyes:      Pupils: Pupils are equal, round, and reactive to light.   Cardiovascular:      Rate and Rhythm: Normal rate and regular rhythm.   Pulmonary:      Effort: Pulmonary effort is normal.      Breath sounds: Normal breath sounds  Abdominal:      General: Abdomen is flat. Bowel sounds are normal.      Palpations: Abdomen is soft.   Genitourinary:     Comments: Genitalia not examined  Skin:     General: Skin is warm and dry.   Neurological:      General: No focal deficit present.      Mental Status: She is alert.      Comments: Oriented x1   Psychiatric:         Mood and Affect: Mood normal.     Assessment/Plan   Problem List Items Addressed This Visit       Vascular dementia with depressed mood (CMS/HCC)    Upper respiratory infection with cough and congestion - Primary       Skin integrity:  Nursing to monitor skin integrity as patient is at risk for pressure injuries.    PLAN:  Pt has been seen for an " follow up visit.  The patient resides in a Long term care facility.  Recent nursing evaluation and notes were reviewed.   Overall, the patient is experiencing the following:  #URI: Feeling better, residual cough, ATB and steroids completed.   #Dementia: no behaviors, redirect as needed  Any decline or change in condition needs to be communicated with the physician or myself.    Discussion with nursing staff regarding ongoing care and management.  If needed, would communicate with family who are not present at this time.   There are no concerns at this time.  We will continue with the medications noted above.    We will continue to follow the patient here at the facility.    Discharge planning: no plan for discharge, LTC resident, rehab potential    *Please note that nursing facility, outside laboratory agency, and  [facility] do not interface.     Completion of the note was done through Dragon voice recognition technology and may include   unintended or grammatical errors which may not have been recognized when finalizing the note.     Time:    Luisa Rivas, DARLIN-CNP

## 2024-08-02 ENCOUNTER — NURSING HOME VISIT (OUTPATIENT)
Dept: POST ACUTE CARE | Facility: EXTERNAL LOCATION | Age: 79
End: 2024-08-02
Payer: COMMERCIAL

## 2024-08-02 DIAGNOSIS — F01.53: ICD-10-CM

## 2024-08-02 DIAGNOSIS — M79.605 LEFT LEG PAIN: Primary | ICD-10-CM

## 2024-08-02 DIAGNOSIS — L89.621 PRESSURE INJURY OF LEFT HEEL, STAGE 1: ICD-10-CM

## 2024-08-02 DIAGNOSIS — R53.1 WEAKNESS: ICD-10-CM

## 2024-08-02 PROCEDURE — 99309 SBSQ NF CARE MODERATE MDM 30: CPT | Performed by: NURSE PRACTITIONER

## 2024-08-02 NOTE — LETTER
Patient: Eli Tay  : 1945    Encounter Date: 2024    Name: Eli Tay  YOB: 1945    ACUTE VISIT: LTC, Swelling pain to left leg, left heel wound    SUBJECTIVE:  The patient is 79 yr old female who is a LTC resident here at [facility]. She has developed unilateral swelling and pain to left leg. Duplex US done  and negative for DVT. She does have a left heel wound as of  that is being seen by wound nurse here. Left foot is painful with exam. X-ray was done to left hip and knee on  and that was negative.   Eli has a h/o dementia but is able to answer questions appropriately. She denies CP or SOB. She reports pain to left lower leg.     REVIEW OF SYSTEMS:   All review of systems are negative unless otherwise stated above under subjective.    LABS REVIEWED AT FACILITY:  7/15/24  WBC 13.5  CMP normal  Uric Acid 7.1    Allergies   Allergen Reactions   • Fenofibrate Anaphylaxis   • Fenofibric Acid Anaphylaxis   • Fenofibric Acid (Choline) Anaphylaxis   • Levofloxacin Anaphylaxis and Unknown   • Penicillins Anaphylaxis, Hives and Unknown       MEDICATIONS REVIEWED AT FACILITY:  Please see Nursing facility Medication EMR for full updated list.    Living will related issues reviewed-Code status: DNRCC    OBJECTIVE:  There were no vitals taken for this visit.  Physical Exam  Constitutional:       Appearance: Normal appearance. She is normal weight.   HENT:      Head: Normocephalic.      Right Ear: External ear normal.      Left Ear: External ear normal.      Nose: Nose normal.      Mouth/Throat:      Mouth: Mucous membranes are moist.   Eyes:      Extraocular Movements: Extraocular movements intact.      Conjunctiva/sclera: Conjunctivae normal.      Pupils: Pupils are equal, round, and reactive to light.   Cardiovascular:      Rate and Rhythm: Normal rate and regular rhythm.      Pulses: Normal pulses.      Heart sounds: Normal heart sounds.   Pulmonary:      Effort:  Pulmonary effort is normal.      Breath sounds: Normal breath sounds.   Abdominal:      General: Bowel sounds are normal. There is no distension.      Palpations: Abdomen is soft.      Tenderness: There is no abdominal tenderness.   Genitourinary:     Comments: Not examined  Musculoskeletal:         General: Tenderness present. Normal range of motion.      Cervical back: Normal range of motion and neck supple.      Comments: Generalized weakness   Skin:     General: Skin is warm and dry.      Capillary Refill: Capillary refill takes less than 2 seconds.      Comments: Left heel pressure injury   Neurological:      General: No focal deficit present.      Mental Status: She is alert. Mental status is at baseline.      Comments: Oriented x1   Psychiatric:      Comments: demented         Assessment/Plan  Problem List Items Addressed This Visit       Vascular dementia with depressed mood (Multi)    Weakness    Left leg pain - Primary    Pressure injury of left heel, stage 1       Skin integrity:  Nursing to monitor skin integrity as patient is at risk for pressure injuries.  Wound care per nursing  Left heel  See Facility notes for measurements/assessment of wound.  Turn and reposition Q 2 hours or more.  Air mattress and when up in chair cushion reducing device.  Dietician to evaluate and recommend.  Nutritional supplement to be implemented if needed.  Please monitor skin integrity and other pressure areas.  Referral to wound clinic or wound team in facility to follow if appropriate.    PLAN:  Pt has been seen for an Acute visit.  The patient resides in a Long term care facility.  Recent nursing evaluation and notes were reviewed.   #Swelling and Pain to left lower leg: Duplex US negative  X-ray negative, Left heel pressure injury. No edema noted today.  Tylenol for pain  Lidocaine pain    #Pressure injury: Wound nurse following.     #Dementia/Insomnia: Redirect and maintain safe environment, Trazodone started  7/29/24    Any decline or change in condition needs to be communicated with the physician or myself.    Discussion with nursing staff regarding ongoing care and management.  Communication regarding patients status, overall condition, changes with plan (medications or treatments), and any questions from family completed if present.  If family not available, would communicate in person or via phone if needed.  We will continue with the medications noted above.    We will continue to follow the patient here at the facility.    Discharge planning: no plan for discharge, LTC resident, rehab potential    *Please note that nursing facility, outside laboratory agency, and [facility] do not interface.     Completion of the note was done through Dragon voice recognition technology and may include   unintended or grammatical errors which may not have been recognized when finalizing the note.     Time:    MORE Madison    Electronically Signed By: MORE Madison   8/6/24 12:36 PM

## 2024-08-06 PROBLEM — L89.621 PRESSURE INJURY OF LEFT HEEL, STAGE 1: Status: ACTIVE | Noted: 2024-08-06

## 2024-08-06 PROBLEM — M79.605 LEFT LEG PAIN: Status: ACTIVE | Noted: 2024-08-06

## 2024-08-06 NOTE — PROGRESS NOTES
Name: Eli Tay  YOB: 1945    ACUTE VISIT: LTC, Swelling pain to left leg, left heel wound    SUBJECTIVE:  The patient is 79 yr old female who is a LTC resident here at [facility]. She has developed unilateral swelling and pain to left leg. Duplex US done 7/31 and negative for DVT. She does have a left heel wound as of 7/30 that is being seen by wound nurse here. Left foot is painful with exam. X-ray was done to left hip and knee on 7/14 and that was negative.   Eli has a h/o dementia but is able to answer questions appropriately. She denies CP or SOB. She reports pain to left lower leg.     REVIEW OF SYSTEMS:   All review of systems are negative unless otherwise stated above under subjective.    LABS REVIEWED AT FACILITY:  7/15/24  WBC 13.5  CMP normal  Uric Acid 7.1    Allergies   Allergen Reactions    Fenofibrate Anaphylaxis    Fenofibric Acid Anaphylaxis    Fenofibric Acid (Choline) Anaphylaxis    Levofloxacin Anaphylaxis and Unknown    Penicillins Anaphylaxis, Hives and Unknown       MEDICATIONS REVIEWED AT FACILITY:  Please see Nursing facility Medication EMR for full updated list.    Living will related issues reviewed-Code status: DNC    OBJECTIVE:  There were no vitals taken for this visit.  Physical Exam  Constitutional:       Appearance: Normal appearance. She is normal weight.   HENT:      Head: Normocephalic.      Right Ear: External ear normal.      Left Ear: External ear normal.      Nose: Nose normal.      Mouth/Throat:      Mouth: Mucous membranes are moist.   Eyes:      Extraocular Movements: Extraocular movements intact.      Conjunctiva/sclera: Conjunctivae normal.      Pupils: Pupils are equal, round, and reactive to light.   Cardiovascular:      Rate and Rhythm: Normal rate and regular rhythm.      Pulses: Normal pulses.      Heart sounds: Normal heart sounds.   Pulmonary:      Effort: Pulmonary effort is normal.      Breath sounds: Normal breath sounds.    Abdominal:      General: Bowel sounds are normal. There is no distension.      Palpations: Abdomen is soft.      Tenderness: There is no abdominal tenderness.   Genitourinary:     Comments: Not examined  Musculoskeletal:         General: Tenderness present. Normal range of motion.      Cervical back: Normal range of motion and neck supple.      Comments: Generalized weakness   Skin:     General: Skin is warm and dry.      Capillary Refill: Capillary refill takes less than 2 seconds.      Comments: Left heel pressure injury   Neurological:      General: No focal deficit present.      Mental Status: She is alert. Mental status is at baseline.      Comments: Oriented x1   Psychiatric:      Comments: demented         Assessment/Plan   Problem List Items Addressed This Visit       Vascular dementia with depressed mood (Multi)    Weakness    Left leg pain - Primary    Pressure injury of left heel, stage 1       Skin integrity:  Nursing to monitor skin integrity as patient is at risk for pressure injuries.  Wound care per nursing  Left heel  See Facility notes for measurements/assessment of wound.  Turn and reposition Q 2 hours or more.  Air mattress and when up in chair cushion reducing device.  Dietician to evaluate and recommend.  Nutritional supplement to be implemented if needed.  Please monitor skin integrity and other pressure areas.  Referral to wound clinic or wound team in facility to follow if appropriate.    PLAN:  Pt has been seen for an Acute visit.  The patient resides in a Long term care facility.  Recent nursing evaluation and notes were reviewed.   #Swelling and Pain to left lower leg: Duplex US negative  X-ray negative, Left heel pressure injury. No edema noted today.  Tylenol for pain  Lidocaine pain    #Pressure injury: Wound nurse following.     #Dementia/Insomnia: Redirect and maintain safe environment, Trazodone started 7/29/24    Any decline or change in condition needs to be communicated with  the physician or myself.    Discussion with nursing staff regarding ongoing care and management.  Communication regarding patients status, overall condition, changes with plan (medications or treatments), and any questions from family completed if present.  If family not available, would communicate in person or via phone if needed.  We will continue with the medications noted above.    We will continue to follow the patient here at the facility.    Discharge planning: no plan for discharge, LTC resident, rehab potential    *Please note that nursing facility, outside laboratory agency, and  [facility] do not interface.     Completion of the note was done through Dragon voice recognition technology and may include   unintended or grammatical errors which may not have been recognized when finalizing the note.     Time:    DARLIN Madison-CNP

## 2025-01-10 ENCOUNTER — NURSING HOME VISIT (OUTPATIENT)
Dept: POST ACUTE CARE | Facility: EXTERNAL LOCATION | Age: 80
End: 2025-01-10
Payer: MEDICARE

## 2025-01-10 DIAGNOSIS — E78.5 HYPERLIPIDEMIA, UNSPECIFIED HYPERLIPIDEMIA TYPE: ICD-10-CM

## 2025-01-10 DIAGNOSIS — M19.90 OSTEOARTHRITIS, UNSPECIFIED OSTEOARTHRITIS TYPE, UNSPECIFIED SITE: ICD-10-CM

## 2025-01-10 DIAGNOSIS — F32.A ANXIETY AND DEPRESSION: ICD-10-CM

## 2025-01-10 DIAGNOSIS — E78.00 HYPERCHOLESTEREMIA: ICD-10-CM

## 2025-01-10 DIAGNOSIS — F01.53 VASCULAR DEMENTIA WITH DEPRESSED MOOD: Primary | ICD-10-CM

## 2025-01-10 DIAGNOSIS — I25.10 CAD, MULTIPLE VESSEL: ICD-10-CM

## 2025-01-10 DIAGNOSIS — F41.9 ANXIETY AND DEPRESSION: ICD-10-CM

## 2025-01-10 DIAGNOSIS — I10 HTN (HYPERTENSION), BENIGN: ICD-10-CM

## 2025-01-10 DIAGNOSIS — J44.9 CHRONIC OBSTRUCTIVE PULMONARY DISEASE, UNSPECIFIED COPD TYPE (MULTI): ICD-10-CM

## 2025-01-10 PROCEDURE — 99308 SBSQ NF CARE LOW MDM 20: CPT | Performed by: NURSE PRACTITIONER

## 2025-01-10 NOTE — LETTER
Patient: Eli Tay  : 1945    Encounter Date: 01/10/2025    Name: Eli Tay  YOB: 1945    MONTHLY VISIT: Long term care resident    SUBJECTIVE:  The patient is a 79 yr old female who is a LTC resident here at [facility]. The patient transitioned here to the facility after a skilled visit in .   PMH of CAD, status post PTCA, hyperlipidemia, COPD, anxiety and depression, hypertension, TIA, and dementia.  The resident was under Hospice care for a few months in  and this was cancelled in 2023.  At this time the patient is sitting up in her wheelchair she appears to be comfortable and in no acute distress patient is confused due to her dementia but is able to communicate.  She complains of lower leg pain.  Nursing reports no concerns at this time.  Patient is seen regularly by the facility physician.  The patient is a DNR CC.    REVIEW OF SYSTEMS:  All review of systems are negative unless otherwise stated above under subjective.    LABS REVIEWED AT FACILITY:  24  CMP-COMPREHENSIVE METABOLIC PNL JIMBO  GLUCOSE 85 mg/dL  GLUCOSE, FASTING 65-99 mg/dL  GLUCOSE, NON-FASTING  mg/dL  SODIUM 142 136-145 mEq/L  POTASSIUM 3.7 3.5-5.3 mEq/L  CHLORIDE 101  mEq/L  CARBON DIOXIDE (CO2) 24 21-33 mEq/L  BUN (UREA NITROGEN) 11 7-25 mg/dL  CREATININE 0.6 0.6-1.2 mg/dL  BUN/CREATININE RATIO 18 6-22  GFR-AFRICAN AMERICAN 117 >60 mL/min/1.73 m2  GFR-NON-AFRICAN AMERICAN 97 >60 mL/min/1.73 m2   Stage of CKD eGFR (mL/min/1.73 square meters)   Stage 1 >/= 90 or > 90   Stage 2 60 - 89   Stage 3 30 - 59   Stage 4 15 - 29   Stage 5 </= 14 or < 15  ** GFR is reliable for adults 17 to 69 years with stable kidney   function.  CALCIUM 8.6 8.4-10.2 mg/dL  PROTEIN, TOTAL 5.8~ L 6.0-8.3 g/dL  ALBUMIN 3.4~ L 3.5-5.5 g/dL  A/G RATIO 1.4 1.0-2.3  ALKALINE PHOS 77  IU/L  AST (SGOT) 14 4-40 IU/L  ALT (SGPT) 8 4-55 IU/L  BILIRUBIN, TOTAL 0.5 0.2-1.2 mg/dL  WBC 8.9, H&H  "14/42.3 and platelets 333    7/15/24  URIC ACID 7.1 2.3-7.6 mg/dL    3/20/24  TSH 3-UL 3.692 0.340-5.500 uIU/mL JIMBO    7/19/2021  VITAMIN B12 221 211-911 pg/mL  LIPID PROFILE w/Reflex Direct LDL JIMBO  CHOLESTEROL 133 <200 mg/dL  TRIGLYCERIDES 251~ H <150 mg/dL  HDL 41~ L >50 mg/dL  LDLc 42 <100  LDLc/HDL RATIO 1.0 <4:1  VLDLc 50~ H 5-40 mg/dL    Allergies   Allergen Reactions   • Fenofibrate Anaphylaxis   • Fenofibric Acid Anaphylaxis   • Fenofibric Acid (Choline) Anaphylaxis   • Levofloxacin Anaphylaxis and Unknown   • Penicillins Anaphylaxis, Hives and Unknown       MEDICATIONS REVIEWED AT FACILITY:  Please see nursing facility medication list.    Living will related issues reviewed-Code status: DNC    OBJECTIVE:  /87   Pulse 90   Temp 36.3 °C (97.3 °F)   Resp 18   Ht 1.626 m (5' 4\")   Wt 58.5 kg (128 lb 14.4 oz)   SpO2 96% Comment: RA  BMI 22.13 kg/m²   Physical Exam  Constitutional:       Appearance: Normal appearance. She is normal weight.   HENT:      Head: Normocephalic.      Mouth: Mucous membranes are moist.   Eyes:      Extraocular Movements: Extraocular movements intact.      Conjunctiva/sclera: Conjunctivae normal.      Pupils: Pupils are equal, round, and reactive to light.   Cardiovascular:      Rate and Rhythm: Normal rate and regular rhythm.      Pulses: Normal pulses.      Heart sounds: Normal heart sounds.   Pulmonary:      Effort: Pulmonary effort is normal.      Breath sounds: Normal breath sounds.   Abdominal:      General: Bowel sounds are normal. There is no distension.      Palpations: Abdomen is soft.      Tenderness: There is no abdominal tenderness.   Genitourinary:     Comments: Not examined  Musculoskeletal:         General: Tenderness present. Normal range of motion.      Cervical back: Normal range of motion and neck supple.      Comments: Generalized weakness   Skin:     General: Skin is warm and dry.      Capillary Refill: Capillary refill takes less than 2 seconds. " Right ankle dressing in place  Neurological:      General: No focal deficit present.      Mental Status: She is alert. Mental status is at baseline.      Comments: Oriented x1   Psychiatric:      Comments: demented            Assessment/Plan  Problem List Items Addressed This Visit       Anxiety and depression    Vascular dementia with depressed mood - Primary    CAD, multiple vessel    Chronic obstructive pulmonary disease (Multi)    HLD (hyperlipidemia)    Hypercholesteremia    HTN (hypertension), benign    Osteoarthritis       Skin integrity:  Nursing to monitor skin integrity as patient is at risk for pressure injuries.  Turn and reposition Q 2 hours or more.  Air mattress and when up in chair cushion reducing device.  Dietician to evaluate and recommend.  Nutritional supplement to be implemented if needed.  Please monitor skin integrity and other pressure areas.    Wound Care :  Wound care per nursing  Right lateral malleolus cleanse with normal saline pat dry apply skin prep to wound bed cover with ABD and Kerlix Monday Wednesday Friday and as needed  Referral to Wound clinic or Wound Nurse Practitioner if appropriate.      PLAN:  Pt has been seen for monthly visit.    The patient is confined to a long term care facility.   Recent nursing evaluation and notes were reviewed.     Dementia, depression, insomnia:  Continue to monitor mood and behaviors.  Mood: calm   Behavior: no behaviors reported  Monitor Weights:  1/2/25: 128.9# (stable)  Psych following.  Continue with medications:  Trazodone 50 mg nightly  Redirect as needed.  Provide safe environment and continue frequent monitoring.  Resident resides in a locked unit.    HTN, CAD, HLD:   Monitor and follow BP readings.   Continue Medications:  No meds  Labs to be done intermittently and adjust meds as needed.  BP readings reviewed:  160/80s, recheck 112/58    Avoid nephrotoxic drugs.    COPD:  Monitor resp status   Oxygen as needed to maintain SPO2 92% or  better  Albuterol nebulizer every 6 hours as needed    Osteoarthritis/generalized discomfort:  Tylenol 650 mg daily  Tylenol 650 mg every 4 hours as needed  Lidocaine patch to lower back topically 12 hours on and 12 hours off as needed    Any decline or change in condition needs to be communicated with the physician or myself.    Discussion with nursing staff regarding ongoing care and management.  Communication regarding patients status, overall condition, changes with plan (medications or treatments), and any questions from family completed if present.  If family not available, would communicate in person or via phone if needed.  We will continue with the plan and medications noted above.    We will continue to follow the patient here at the facility.    Discharge planning:   No plan for discharge, LTC resident, rehab potential    *Please note that nursing facility notes, facility EMR, outside laboratory agency, and  EMR do not interface.     Completion of the note was done through Dragon voice recognition technology and may include unintended or grammatical errors which may not have been recognized when finalizing the note.     Time:    MORE Madison      Electronically Signed By: MORE Madison   1/29/25  3:02 PM

## 2025-01-29 VITALS
HEIGHT: 64 IN | SYSTOLIC BLOOD PRESSURE: 161 MMHG | RESPIRATION RATE: 18 BRPM | TEMPERATURE: 97.3 F | HEART RATE: 90 BPM | OXYGEN SATURATION: 96 % | DIASTOLIC BLOOD PRESSURE: 87 MMHG | WEIGHT: 128.9 LBS | BODY MASS INDEX: 22.01 KG/M2

## 2025-01-29 NOTE — PROGRESS NOTES
Name: Eli Tay  YOB: 1945    MONTHLY VISIT: Long term care resident    SUBJECTIVE:  The patient is a 79 yr old female who is a LTC resident here at [facility]. The patient transitioned here to the facility after a skilled visit in Jan of 2021.   PMH of CAD, status post PTCA, hyperlipidemia, COPD, anxiety and depression, hypertension, TIA, and dementia.  The resident was under Hospice care for a few months in 2023 and this was cancelled in July 2023.  At this time the patient is sitting up in her wheelchair she appears to be comfortable and in no acute distress patient is confused due to her dementia but is able to communicate.  She complains of lower leg pain.  Nursing reports no concerns at this time.  Patient is seen regularly by the facility physician.  The patient is a DNR CC.    REVIEW OF SYSTEMS:  All review of systems are negative unless otherwise stated above under subjective.    LABS REVIEWED AT FACILITY:  12/30/24  CMP-COMPREHENSIVE METABOLIC PNL JIMBO  GLUCOSE 85 mg/dL  GLUCOSE, FASTING 65-99 mg/dL  GLUCOSE, NON-FASTING  mg/dL  SODIUM 142 136-145 mEq/L  POTASSIUM 3.7 3.5-5.3 mEq/L  CHLORIDE 101  mEq/L  CARBON DIOXIDE (CO2) 24 21-33 mEq/L  BUN (UREA NITROGEN) 11 7-25 mg/dL  CREATININE 0.6 0.6-1.2 mg/dL  BUN/CREATININE RATIO 18 6-22  GFR-AFRICAN AMERICAN 117 >60 mL/min/1.73 m2  GFR-NON-AFRICAN AMERICAN 97 >60 mL/min/1.73 m2   Stage of CKD eGFR (mL/min/1.73 square meters)   Stage 1 >/= 90 or > 90   Stage 2 60 - 89   Stage 3 30 - 59   Stage 4 15 - 29   Stage 5 </= 14 or < 15  ** GFR is reliable for adults 17 to 69 years with stable kidney   function.  CALCIUM 8.6 8.4-10.2 mg/dL  PROTEIN, TOTAL 5.8~ L 6.0-8.3 g/dL  ALBUMIN 3.4~ L 3.5-5.5 g/dL  A/G RATIO 1.4 1.0-2.3  ALKALINE PHOS 77  IU/L  AST (SGOT) 14 4-40 IU/L  ALT (SGPT) 8 4-55 IU/L  BILIRUBIN, TOTAL 0.5 0.2-1.2 mg/dL  WBC 8.9, H&H 14/42.3 and platelets 333    7/15/24  URIC ACID 7.1 2.3-7.6  "mg/dL    3/20/24  TSH 3-UL 3.692 0.340-5.500 uIU/mL JIMBO    7/19/2021  VITAMIN B12 221 211-911 pg/mL  LIPID PROFILE w/Reflex Direct LDL JIMBO  CHOLESTEROL 133 <200 mg/dL  TRIGLYCERIDES 251~ H <150 mg/dL  HDL 41~ L >50 mg/dL  LDLc 42 <100  LDLc/HDL RATIO 1.0 <4:1  VLDLc 50~ H 5-40 mg/dL    Allergies   Allergen Reactions    Fenofibrate Anaphylaxis    Fenofibric Acid Anaphylaxis    Fenofibric Acid (Choline) Anaphylaxis    Levofloxacin Anaphylaxis and Unknown    Penicillins Anaphylaxis, Hives and Unknown       MEDICATIONS REVIEWED AT FACILITY:  Please see nursing facility medication list.    Living will related issues reviewed-Code status: DNRCC    OBJECTIVE:  /87   Pulse 90   Temp 36.3 °C (97.3 °F)   Resp 18   Ht 1.626 m (5' 4\")   Wt 58.5 kg (128 lb 14.4 oz)   SpO2 96% Comment: RA  BMI 22.13 kg/m²   Physical Exam  Constitutional:       Appearance: Normal appearance. She is normal weight.   HENT:      Head: Normocephalic.      Mouth: Mucous membranes are moist.   Eyes:      Extraocular Movements: Extraocular movements intact.      Conjunctiva/sclera: Conjunctivae normal.      Pupils: Pupils are equal, round, and reactive to light.   Cardiovascular:      Rate and Rhythm: Normal rate and regular rhythm.      Pulses: Normal pulses.      Heart sounds: Normal heart sounds.   Pulmonary:      Effort: Pulmonary effort is normal.      Breath sounds: Normal breath sounds.   Abdominal:      General: Bowel sounds are normal. There is no distension.      Palpations: Abdomen is soft.      Tenderness: There is no abdominal tenderness.   Genitourinary:     Comments: Not examined  Musculoskeletal:         General: Tenderness present. Normal range of motion.      Cervical back: Normal range of motion and neck supple.      Comments: Generalized weakness   Skin:     General: Skin is warm and dry.      Capillary Refill: Capillary refill takes less than 2 seconds. Right ankle dressing in place  Neurological:      General: No focal " deficit present.      Mental Status: She is alert. Mental status is at baseline.      Comments: Oriented x1   Psychiatric:      Comments: demented            Assessment/Plan   Problem List Items Addressed This Visit       Anxiety and depression    Vascular dementia with depressed mood - Primary    CAD, multiple vessel    Chronic obstructive pulmonary disease (Multi)    HLD (hyperlipidemia)    Hypercholesteremia    HTN (hypertension), benign    Osteoarthritis       Skin integrity:  Nursing to monitor skin integrity as patient is at risk for pressure injuries.  Turn and reposition Q 2 hours or more.  Air mattress and when up in chair cushion reducing device.  Dietician to evaluate and recommend.  Nutritional supplement to be implemented if needed.  Please monitor skin integrity and other pressure areas.    Wound Care :  Wound care per nursing  Right lateral malleolus cleanse with normal saline pat dry apply skin prep to wound bed cover with ABD and Kerlix Monday Wednesday Friday and as needed  Referral to Wound clinic or Wound Nurse Practitioner if appropriate.      PLAN:  Pt has been seen for monthly visit.    The patient is confined to a long term care facility.   Recent nursing evaluation and notes were reviewed.     Dementia, depression, insomnia:  Continue to monitor mood and behaviors.  Mood: calm   Behavior: no behaviors reported  Monitor Weights:  1/2/25: 128.9# (stable)  Psych following.  Continue with medications:  Trazodone 50 mg nightly  Redirect as needed.  Provide safe environment and continue frequent monitoring.  Resident resides in a locked unit.    HTN, CAD, HLD:   Monitor and follow BP readings.   Continue Medications:  No meds  Labs to be done intermittently and adjust meds as needed.  BP readings reviewed:  160/80s, recheck 112/58    Avoid nephrotoxic drugs.    COPD:  Monitor resp status   Oxygen as needed to maintain SPO2 92% or better  Albuterol nebulizer every 6 hours as  needed    Osteoarthritis/generalized discomfort:  Tylenol 650 mg daily  Tylenol 650 mg every 4 hours as needed  Lidocaine patch to lower back topically 12 hours on and 12 hours off as needed    Any decline or change in condition needs to be communicated with the physician or myself.    Discussion with nursing staff regarding ongoing care and management.  Communication regarding patients status, overall condition, changes with plan (medications or treatments), and any questions from family completed if present.  If family not available, would communicate in person or via phone if needed.  We will continue with the plan and medications noted above.    We will continue to follow the patient here at the facility.    Discharge planning:   No plan for discharge, LTC resident, rehab potential    *Please note that nursing facility notes, facility EMR, outside laboratory agency, and  EMR do not interface.     Completion of the note was done through Dragon voice recognition technology and may include unintended or grammatical errors which may not have been recognized when finalizing the note.     Time:    Luisa Rivas, APRN-CNP

## 2025-02-21 ENCOUNTER — NURSING HOME VISIT (OUTPATIENT)
Dept: POST ACUTE CARE | Facility: EXTERNAL LOCATION | Age: 80
End: 2025-02-21
Payer: MEDICARE

## 2025-02-21 DIAGNOSIS — F32.A ANXIETY AND DEPRESSION: ICD-10-CM

## 2025-02-21 DIAGNOSIS — J44.9 CHRONIC OBSTRUCTIVE PULMONARY DISEASE, UNSPECIFIED COPD TYPE (MULTI): ICD-10-CM

## 2025-02-21 DIAGNOSIS — R53.1 WEAKNESS: ICD-10-CM

## 2025-02-21 DIAGNOSIS — M79.604 PAIN IN BOTH LOWER EXTREMITIES: Primary | ICD-10-CM

## 2025-02-21 DIAGNOSIS — I10 HTN (HYPERTENSION), BENIGN: ICD-10-CM

## 2025-02-21 DIAGNOSIS — F41.9 ANXIETY AND DEPRESSION: ICD-10-CM

## 2025-02-21 DIAGNOSIS — F01.53 VASCULAR DEMENTIA WITH DEPRESSED MOOD: ICD-10-CM

## 2025-02-21 DIAGNOSIS — M62.838 MUSCLE SPASMS OF BOTH LOWER EXTREMITIES: ICD-10-CM

## 2025-02-21 DIAGNOSIS — M79.605 PAIN IN BOTH LOWER EXTREMITIES: Primary | ICD-10-CM

## 2025-02-21 DIAGNOSIS — M19.90 OSTEOARTHRITIS, UNSPECIFIED OSTEOARTHRITIS TYPE, UNSPECIFIED SITE: ICD-10-CM

## 2025-02-21 NOTE — LETTER
"Patient: Eli Tay  : 1945    Encounter Date: 2025    Name: Eli Tay  YOB: 1945    ACUTE VISIT: Long term care resident    SUBJECTIVE:  Nursing reports that patient is having severe pain to lower legs.  Patient is resting in bed. She has a h/o dementia and not able to have a meaningful conversation. Legs are folded up and appear contracted. Legs stretched out by me and patient yells out, \"you bitch\".     REVIEW OF SYSTEMS:   All review of systems are negative unless otherwise stated above under subjective.    Allergies   Allergen Reactions   • Fenofibrate Anaphylaxis   • Fenofibric Acid Anaphylaxis   • Fenofibric Acid (Choline) Anaphylaxis   • Levofloxacin Anaphylaxis and Unknown   • Penicillins Anaphylaxis, Hives and Unknown       MEDICATIONS REVIEWED AT FACILITY:  Please see nursing facility medication list.    [facility]    OBJECTIVE:  /54   Physical Exam  Constitutional:       Appearance: chronically ill appearing. She is normal weight.   HENT:      Head: Normocephalic.      Mouth: Mucous membranes are moist.   Eyes:      Extraocular Movements: Extraocular movements intact.      Conjunctiva/sclera: Conjunctivae normal.      Pupils: Pupils are equal, round, and reactive to light.   Cardiovascular:      Rate and Rhythm: Normal rate and regular rhythm.      Pulses: Normal pulses.      Heart sounds: Normal heart sounds.   Pulmonary:      Effort: Pulmonary effort is normal.      Breath sounds: Normal breath sounds.   Abdominal:      General: Bowel sounds are normal. There is no distension.      Palpations: Abdomen is soft.      Tenderness: There is no abdominal tenderness.   Genitourinary:     Comments: Not examined  Musculoskeletal:         General: Tenderness present. Normal range of motion.      Cervical back: Normal range of motion and neck supple.      Comments: Generalized weakness, both legs tight and " painful  Skin:     General: Skin is warm and dry.      Capillary Refill: Capillary refill takes less than 2 seconds.   Neurological:      General: No focal deficit present.      Mental Status: She is alert. Mental status is at baseline.      Comments: Oriented x1   Psychiatric:      Comments: demented     Assessment/Plan  Problem List Items Addressed This Visit       Anxiety and depression    Vascular dementia with depressed mood    Chronic obstructive pulmonary disease (Multi)    HTN (hypertension), benign    Osteoarthritis    Weakness     Other Visit Diagnoses       Pain in both lower extremities    -  Primary    Muscle spasms of both lower extremities                Skin integrity:  Nursing to monitor skin integrity as patient is at risk for pressure injuries.  Turn and reposition Q 2 hours or more.  Air mattress and when up in chair cushion reducing device.  Dietician to evaluate and recommend.  Nutritional supplement to be implemented if needed.  Please monitor skin integrity and other pressure areas.    PLAN:  Pt has been seen for an Acute visit.  The patient resides in a Long term care facility.  Recent nursing evaluation and notes were reviewed.     Leg pain, spasms, Osteoarthritis/generalized discomfort:  Tylenol 650 mg daily  Tylenol 650 mg every 4 hours as needed  Lidocaine patch to lower back topically 12 hours on and 12 hours off as needed  Increase Cymbalta to 60 mg daily  Start Baclofen 5 mg TID  Start Oxycodone 5 mg Q12 hr PRN x 10 days    Dementia, depression, insomnia:  Continue to monitor mood and behaviors.  Mood: calm   Behavior: no behaviors reported  Monitor Weights:  1/2/25: 128.9# (stable)  2/5/25: 129.6#  Psych following.  Continue with medications:  Trazodone 50 mg nightly  Cymbalta increased to 60 mg daily  Redirect as needed.  Provide safe environment and continue frequent monitoring.  Resident resides in a locked unit.     HTN, CAD, HLD:   Monitor and follow BP readings.   Continue  Medications:  No meds  Labs to be done intermittently and adjust meds as needed.  BP readings reviewed:  130/50s  Avoid nephrotoxic drugs.     COPD:  Monitor resp status - stable  Oxygen as needed to maintain SPO2 92% or better  Albuterol nebulizer every 6 hours as needed         Any decline or change in condition needs to be communicated with the physician or myself.    Discussion with nursing staff regarding ongoing care and management.  Communication regarding patients status, overall condition, changes with plan (medications or treatments), and any questions from family completed if present.  If family not available, would communicate in person or via phone if needed.  We will continue with the medications noted above.    We will continue to follow the patient here at the facility.    Discharge planning:   No plan for discharge, LTC resident, rehab potential    *Please note that nursing facility notes, facility EMR, outside laboratory agency, and  EMR do not interface.     Completion of the note was done through Dragon voice recognition technology and may include unintended or grammatical errors which may not have been recognized when finalizing the note.     Time:    MORE Madison      Electronically Signed By: MORE Madison   3/21/25  8:33 PM

## 2025-02-28 ENCOUNTER — NURSING HOME VISIT (OUTPATIENT)
Dept: POST ACUTE CARE | Facility: EXTERNAL LOCATION | Age: 80
End: 2025-02-28
Payer: MEDICARE

## 2025-02-28 DIAGNOSIS — G89.29 OTHER CHRONIC PAIN: ICD-10-CM

## 2025-02-28 DIAGNOSIS — M62.838 MUSCLE SPASMS OF BOTH LOWER EXTREMITIES: Primary | ICD-10-CM

## 2025-02-28 DIAGNOSIS — M79.605 PAIN IN BOTH LOWER EXTREMITIES: ICD-10-CM

## 2025-02-28 DIAGNOSIS — M79.604 PAIN IN BOTH LOWER EXTREMITIES: ICD-10-CM

## 2025-02-28 DIAGNOSIS — F32.A ANXIETY AND DEPRESSION: ICD-10-CM

## 2025-02-28 DIAGNOSIS — F41.9 ANXIETY AND DEPRESSION: ICD-10-CM

## 2025-02-28 DIAGNOSIS — M19.90 OSTEOARTHRITIS, UNSPECIFIED OSTEOARTHRITIS TYPE, UNSPECIFIED SITE: ICD-10-CM

## 2025-02-28 PROCEDURE — 99309 SBSQ NF CARE MODERATE MDM 30: CPT | Performed by: NURSE PRACTITIONER

## 2025-02-28 NOTE — LETTER
"Patient: Eli Tay  : 1945    Encounter Date: 2025    Name: Eli Tay  YOB: 1945    FOLLOW UP VISIT: Long term care resident    SUBJECTIVE:  The patient was recently seen for an acute visit on  for muscle spasms and severe pain in lower legs. She was started on Baclofen 5 mg TID, Oxy 5 mg Q12 hr PRN x 10 days, and Cymbalta was increased to 60 mg daily.   Nursing reports that patient is more lethargic and \"not acting right\". Baclofen d'cd.   Pat is currently resting in bed. She appears to be much more comfortable than my previous visit. She is awake. Baclofen was d'cd . Nursing reports patient is more herself w/Baclofen d'cd.   Patient is confused and not able to have a meaningful conversation.     REVIEW OF SYSTEMS:   All review of systems are negative unless otherwise stated above under subjective.    Allergies   Allergen Reactions   • Fenofibrate Anaphylaxis   • Fenofibric Acid Anaphylaxis   • Fenofibric Acid (Choline) Anaphylaxis   • Levofloxacin Anaphylaxis and Unknown   • Penicillins Anaphylaxis, Hives and Unknown       MEDICATIONS REVIEWED AT FACILITY:  Please see nursing facility medication list.    Living will related issues reviewed-Code status: DNC    OBJECTIVE:  /54   Pulse 68   Physical Exam  Constitutional:       Appearance: chronically ill appearing. She is normal weight.   HENT:      Head: Normocephalic.      Mouth: Mucous membranes are moist.   Eyes:      Extraocular Movements: Extraocular movements intact.      Conjunctiva/sclera: Conjunctivae normal.      Pupils: Pupils are equal, round, and reactive to light.   Cardiovascular:      Rate and Rhythm: Normal rate and regular rhythm.      Pulses: Normal pulses.      Heart sounds: Normal heart sounds.   Pulmonary:      Effort: Pulmonary effort is normal.      Breath sounds: Normal breath sounds.   Abdominal:      General: Bowel sounds are normal. There is no distension.      Palpations: " Abdomen is soft.      Tenderness: There is no abdominal tenderness.   Genitourinary:     Comments: Not examined  Musculoskeletal:         General: Tenderness present. Normal range of motion.      Cervical back: Normal range of motion and neck supple.      Comments: Generalized weakness, lower legs tight but can be extended out straight  Skin:     General: Skin is warm and dry.      Capillary Refill: Capillary refill takes less than 2 seconds.   Neurological:      General: No focal deficit present.      Mental Status: She is alert. Mental status is at baseline.      Comments: Oriented x1   Psychiatric:      Comments: demented        Assessment/Plan  Problem List Items Addressed This Visit       Anxiety and depression    Osteoarthritis     Other Visit Diagnoses       Muscle spasms of both lower extremities    -  Primary    Pain in both lower extremities        Other chronic pain                Skin integrity:  Nursing to monitor skin integrity as patient is at risk for pressure injuries.  Turn and reposition Q 2 hours or more.  Air mattress and when up in chair cushion reducing device.  Dietician to evaluate and recommend.  Nutritional supplement to be implemented if needed.  Please monitor skin integrity and other pressure areas.    PLAN:  Pt has been seen for an follow up visit - see HPI.  The patient resides in a Long term care facility.  Recent nursing evaluation and notes were reviewed.     Leg pain, spasms, Osteoarthritis/generalized discomfort:  Tylenol 650 mg daily  Tylenol 650 mg every 4 hours as needed  Lidocaine patch to lower back topically 12 hours on and 12 hours off as needed  Increase Cymbalta to 60 mg daily  D'cd Baclofen 5 mg TID - too much at this time for patient  Start Oxycodone 5 mg Q12 hr PRN x 10 days     Dementia, depression, insomnia:  Continue to monitor mood and behaviors.  Mood: calm   Behavior: no behaviors reported  Monitor Weights:  1/2/25: 128.9# (stable)  2/5/25: 129.6#  Psych  following.  Continue with medications:  Trazodone 50 mg nightly  Cymbalta increased to 60 mg daily  Redirect as needed.  Provide safe environment and continue frequent monitoring.  Resident resides in a locked unit.    Any decline or change in condition needs to be communicated with the physician or myself.    Discussion with nursing staff regarding ongoing care and management.  Communication regarding patients status, overall condition, changes with plan (medications or treatments), and any questions from family completed if present.  If family not available, would communicate in person or via phone if needed.  We will continue with the medications noted above.    We will continue to follow the patient here at the facility.    Discharge planning:   No plan for discharge, LTC resident, rehab potential    *Please note that nursing facility notes, facility EMR, outside laboratory agency, and  EMR do not interface.     Completion of the note was done through Dragon voice recognition technology and may include unintended or grammatical errors which may not have been recognized when finalizing the note.     Time:    MORE Madison      Electronically Signed By: MORE Madison   3/28/25  5:13 PM

## 2025-03-21 VITALS — SYSTOLIC BLOOD PRESSURE: 134 MMHG | DIASTOLIC BLOOD PRESSURE: 54 MMHG

## 2025-03-22 NOTE — PROGRESS NOTES
"Name: Eli Tay  YOB: 1945    ACUTE VISIT: Long term care resident    SUBJECTIVE:  Nursing reports that patient is having severe pain to lower legs.  Patient is resting in bed. She has a h/o dementia and not able to have a meaningful conversation. Legs are folded up and appear contracted. Legs stretched out by me and patient yells out, \"you bitch\".     REVIEW OF SYSTEMS:   All review of systems are negative unless otherwise stated above under subjective.    Allergies   Allergen Reactions    Fenofibrate Anaphylaxis    Fenofibric Acid Anaphylaxis    Fenofibric Acid (Choline) Anaphylaxis    Levofloxacin Anaphylaxis and Unknown    Penicillins Anaphylaxis, Hives and Unknown       MEDICATIONS REVIEWED AT FACILITY:  Please see nursing facility medication list.    [facility]    OBJECTIVE:  /54   Physical Exam  Constitutional:       Appearance: chronically ill appearing. She is normal weight.   HENT:      Head: Normocephalic.      Mouth: Mucous membranes are moist.   Eyes:      Extraocular Movements: Extraocular movements intact.      Conjunctiva/sclera: Conjunctivae normal.      Pupils: Pupils are equal, round, and reactive to light.   Cardiovascular:      Rate and Rhythm: Normal rate and regular rhythm.      Pulses: Normal pulses.      Heart sounds: Normal heart sounds.   Pulmonary:      Effort: Pulmonary effort is normal.      Breath sounds: Normal breath sounds.   Abdominal:      General: Bowel sounds are normal. There is no distension.      Palpations: Abdomen is soft.      Tenderness: There is no abdominal tenderness.   Genitourinary:     Comments: Not examined  Musculoskeletal:         General: Tenderness present. Normal range of motion.      Cervical back: Normal range of motion and neck supple.      Comments: Generalized weakness, both legs tight and painful  Skin:     General: Skin is warm and dry.      Capillary Refill: Capillary refill " takes less than 2 seconds.   Neurological:      General: No focal deficit present.      Mental Status: She is alert. Mental status is at baseline.      Comments: Oriented x1   Psychiatric:      Comments: demented     Assessment/Plan   Problem List Items Addressed This Visit       Anxiety and depression    Vascular dementia with depressed mood    Chronic obstructive pulmonary disease (Multi)    HTN (hypertension), benign    Osteoarthritis    Weakness     Other Visit Diagnoses       Pain in both lower extremities    -  Primary    Muscle spasms of both lower extremities                Skin integrity:  Nursing to monitor skin integrity as patient is at risk for pressure injuries.  Turn and reposition Q 2 hours or more.  Air mattress and when up in chair cushion reducing device.  Dietician to evaluate and recommend.  Nutritional supplement to be implemented if needed.  Please monitor skin integrity and other pressure areas.    PLAN:  Pt has been seen for an Acute visit.  The patient resides in a Long term care facility.  Recent nursing evaluation and notes were reviewed.     Leg pain, spasms, Osteoarthritis/generalized discomfort:  Tylenol 650 mg daily  Tylenol 650 mg every 4 hours as needed  Lidocaine patch to lower back topically 12 hours on and 12 hours off as needed  Increase Cymbalta to 60 mg daily  Start Baclofen 5 mg TID  Start Oxycodone 5 mg Q12 hr PRN x 10 days    Dementia, depression, insomnia:  Continue to monitor mood and behaviors.  Mood: calm   Behavior: no behaviors reported  Monitor Weights:  1/2/25: 128.9# (stable)  2/5/25: 129.6#  Psych following.  Continue with medications:  Trazodone 50 mg nightly  Cymbalta increased to 60 mg daily  Redirect as needed.  Provide safe environment and continue frequent monitoring.  Resident resides in a locked unit.     HTN, CAD, HLD:   Monitor and follow BP readings.   Continue Medications:  No meds  Labs to be done intermittently and adjust meds as needed.  BP readings  reviewed:  130/50s  Avoid nephrotoxic drugs.     COPD:  Monitor resp status - stable  Oxygen as needed to maintain SPO2 92% or better  Albuterol nebulizer every 6 hours as needed         Any decline or change in condition needs to be communicated with the physician or myself.    Discussion with nursing staff regarding ongoing care and management.  Communication regarding patients status, overall condition, changes with plan (medications or treatments), and any questions from family completed if present.  If family not available, would communicate in person or via phone if needed.  We will continue with the medications noted above.    We will continue to follow the patient here at the facility.    Discharge planning:   No plan for discharge, LTC resident, rehab potential    *Please note that nursing facility notes, facility EMR, outside laboratory agency, and  EMR do not interface.     Completion of the note was done through Dragon voice recognition technology and may include unintended or grammatical errors which may not have been recognized when finalizing the note.     Time:    Luisa Rivas, APRN-CNP

## 2025-03-28 VITALS — HEART RATE: 68 BPM | DIASTOLIC BLOOD PRESSURE: 54 MMHG | SYSTOLIC BLOOD PRESSURE: 134 MMHG

## 2025-03-28 NOTE — PROGRESS NOTES
"Name: Eli Tay  YOB: 1945    FOLLOW UP VISIT: Long term care resident    SUBJECTIVE:  The patient was recently seen for an acute visit on 2/21 for muscle spasms and severe pain in lower legs. She was started on Baclofen 5 mg TID, Oxy 5 mg Q12 hr PRN x 10 days, and Cymbalta was increased to 60 mg daily.   Nursing reports that patient is more lethargic and \"not acting right\". Baclofen d'cd.   Pat is currently resting in bed. She appears to be much more comfortable than my previous visit. She is awake. Baclofen was d'cd 2/27. Nursing reports patient is more herself w/Baclofen d'cd.   Patient is confused and not able to have a meaningful conversation.     REVIEW OF SYSTEMS:   All review of systems are negative unless otherwise stated above under subjective.    Allergies   Allergen Reactions    Fenofibrate Anaphylaxis    Fenofibric Acid Anaphylaxis    Fenofibric Acid (Choline) Anaphylaxis    Levofloxacin Anaphylaxis and Unknown    Penicillins Anaphylaxis, Hives and Unknown       MEDICATIONS REVIEWED AT FACILITY:  Please see nursing facility medication list.    [facility]    OBJECTIVE:  /54   Pulse 68   Physical Exam  Constitutional:       Appearance: chronically ill appearing. She is normal weight.   HENT:      Head: Normocephalic.      Mouth: Mucous membranes are moist.   Eyes:      Extraocular Movements: Extraocular movements intact.      Conjunctiva/sclera: Conjunctivae normal.      Pupils: Pupils are equal, round, and reactive to light.   Cardiovascular:      Rate and Rhythm: Normal rate and regular rhythm.      Pulses: Normal pulses.      Heart sounds: Normal heart sounds.   Pulmonary:      Effort: Pulmonary effort is normal.      Breath sounds: Normal breath sounds.   Abdominal:      General: Bowel sounds are normal. There is no distension.      Palpations: Abdomen is soft.      Tenderness: There is no abdominal tenderness. "   Genitourinary:     Comments: Not examined  Musculoskeletal:         General: Tenderness present. Normal range of motion.      Cervical back: Normal range of motion and neck supple.      Comments: Generalized weakness, lower legs tight but can be extended out straight  Skin:     General: Skin is warm and dry.      Capillary Refill: Capillary refill takes less than 2 seconds.   Neurological:      General: No focal deficit present.      Mental Status: She is alert. Mental status is at baseline.      Comments: Oriented x1   Psychiatric:      Comments: demented        Assessment/Plan   Problem List Items Addressed This Visit       Anxiety and depression    Osteoarthritis     Other Visit Diagnoses       Muscle spasms of both lower extremities    -  Primary    Pain in both lower extremities        Other chronic pain                Skin integrity:  Nursing to monitor skin integrity as patient is at risk for pressure injuries.  Turn and reposition Q 2 hours or more.  Air mattress and when up in chair cushion reducing device.  Dietician to evaluate and recommend.  Nutritional supplement to be implemented if needed.  Please monitor skin integrity and other pressure areas.    PLAN:  Pt has been seen for an follow up visit - see HPI.  The patient resides in a Long term care facility.  Recent nursing evaluation and notes were reviewed.     Leg pain, spasms, Osteoarthritis/generalized discomfort:  Tylenol 650 mg daily  Tylenol 650 mg every 4 hours as needed  Lidocaine patch to lower back topically 12 hours on and 12 hours off as needed  Increase Cymbalta to 60 mg daily  D'cd Baclofen 5 mg TID - too much at this time for patient  Start Oxycodone 5 mg Q12 hr PRN x 10 days     Dementia, depression, insomnia:  Continue to monitor mood and behaviors.  Mood: calm   Behavior: no behaviors reported  Monitor Weights:  1/2/25: 128.9# (stable)  2/5/25: 129.6#  Psych following.  Continue with medications:  Trazodone 50 mg nightly  Cymbalta  increased to 60 mg daily  Redirect as needed.  Provide safe environment and continue frequent monitoring.  Resident resides in a locked unit.    Any decline or change in condition needs to be communicated with the physician or myself.    Discussion with nursing staff regarding ongoing care and management.  Communication regarding patients status, overall condition, changes with plan (medications or treatments), and any questions from family completed if present.  If family not available, would communicate in person or via phone if needed.  We will continue with the medications noted above.    We will continue to follow the patient here at the facility.    Discharge planning:   No plan for discharge, LTC resident, rehab potential    *Please note that nursing facility notes, facility EMR, outside laboratory agency, and  EMR do not interface.     Completion of the note was done through Dragon voice recognition technology and may include unintended or grammatical errors which may not have been recognized when finalizing the note.     Time:    DARLIN Madison-CNP

## 2025-04-01 ENCOUNTER — NURSING HOME VISIT (OUTPATIENT)
Dept: POST ACUTE CARE | Facility: EXTERNAL LOCATION | Age: 80
End: 2025-04-01
Payer: MEDICARE

## 2025-04-01 DIAGNOSIS — F01.53 VASCULAR DEMENTIA WITH DEPRESSED MOOD: Primary | ICD-10-CM

## 2025-04-01 DIAGNOSIS — M19.90 OSTEOARTHRITIS, UNSPECIFIED OSTEOARTHRITIS TYPE, UNSPECIFIED SITE: ICD-10-CM

## 2025-04-01 DIAGNOSIS — J44.9 CHRONIC OBSTRUCTIVE PULMONARY DISEASE, UNSPECIFIED COPD TYPE (MULTI): ICD-10-CM

## 2025-04-01 DIAGNOSIS — I10 HTN (HYPERTENSION), BENIGN: ICD-10-CM

## 2025-04-01 DIAGNOSIS — M79.604 PAIN IN BOTH LOWER EXTREMITIES: ICD-10-CM

## 2025-04-01 DIAGNOSIS — L89.619 PRESSURE INJURY OF SKIN OF RIGHT HEEL, UNSPECIFIED INJURY STAGE: ICD-10-CM

## 2025-04-01 DIAGNOSIS — F32.A ANXIETY AND DEPRESSION: ICD-10-CM

## 2025-04-01 DIAGNOSIS — G89.29 OTHER CHRONIC PAIN: ICD-10-CM

## 2025-04-01 DIAGNOSIS — M79.605 PAIN IN BOTH LOWER EXTREMITIES: ICD-10-CM

## 2025-04-01 DIAGNOSIS — F41.9 ANXIETY AND DEPRESSION: ICD-10-CM

## 2025-04-01 PROCEDURE — 99309 SBSQ NF CARE MODERATE MDM 30: CPT | Performed by: NURSE PRACTITIONER

## 2025-04-01 NOTE — LETTER
Patient: Eli Tay  : 1945    Encounter Date: 2025    Name: Eli Tay  YOB: 1945    ACUTE VISIT: Long term care resident, family requesting Hospice    Assessment/Plan  Problem List Items Addressed This Visit       Anxiety and depression    Vascular dementia with depressed mood - Primary    Chronic obstructive pulmonary disease (Multi)    HTN (hypertension), benign    Osteoarthritis     Other Visit Diagnoses         Pain in both lower extremities          Other chronic pain          Pressure injury of skin of right heel, unspecified injury stage                PLAN:  Pt has been seen for an Acute visit.  The patient resides in a Long term care facility.  Recent nursing evaluation and notes were reviewed.     Referral for Hospice:  Hospice is for patients with serious or terminal illnesses who are no longer being treated to cure the disease.   The goal of hospice care is to keep the patient comfortable.   Decisions about medications are made by the Facility Physician, APRN, and Hospice team collaboratively with the patient and family.  Medications for chronic illnesses are not normally continued long term as medications could cause harm or are no longer benefit the patient to maintain comfort.   Patients and family can request for certain medications to be continued if the physician, APRN, and Hospice team is in agreement to continue these medications as the goal of Hospice care is to provide comfort.   Medications that are continued or initiated are medications for pain, antiemetics, antidepressants, antianxiety, oxygen and/or medications for respiratory symptoms.   Labs and diagnostic testing are not routinely done while the patient is on Hospice care but can be ordered per the discretion of the physician, APRN, and Hospice team if absolutely needed.    Dementia, depression, insomnia:  Continue to monitor mood and behaviors.  Mood: calm   Behavior: yelling out at  times  Monitor Weights:  1/2/25: 128.9# (stable)  4/1/25: 134#  Psych following.  Continue with medications:  Trazodone 50 mg nightly  Cymbalta 60 mg daily  Redirect as needed.  Provide safe environment and continue frequent monitoring.  Resident resides in a locked unit.     HTN, CAD, HLD:   Monitor and follow BP readings.   Continue Medications:  No meds  Labs to be done intermittently and adjust meds as needed.  BP readings reviewed:  116/52  Avoid nephrotoxic drugs.     COPD:  Monitor resp status - stable 96% on RA  Oxygen as needed to maintain SPO2 92% or better  Albuterol nebulizer every 6 hours as needed     Osteoarthritis/generalized discomfort:  Tylenol 650 mg daily  Oxycodone 5 mg BID PRN  Tylenol 650 mg every 4 hours as needed  Lidocaine patch to lower back topically 12 hours on and 12 hours off as needed    Skin integrity:  Nursing will continue to monitor skin integrity as patient is at risk for pressure injuries.  Turn and reposition Q2 hours or more.  Air mattress and when up in chair cushion reducing device.  Dietician to evaluate and recommend.  Labs done intermittently and followed.  Nutritional supplement to be implemented if needed.  Please monitor skin integrity and other pressure areas.    Wound Care :  Wound care per nursing  R lateral malleolus  Referral to Wound clinic or Wound Nurse Practitioner if appropriate.      Any decline or change in condition needs to be communicated with the physician or myself.    Discussion with nursing staff regarding ongoing care and management.  Communication regarding patients status, overall condition, changes with plan (medications or treatments), and any questions from family completed if present.  If family not available, would communicate in person or via phone if needed.  We will continue with the medications noted above.    We will continue to follow the patient here at the facility.    SUBJECTIVE:  The patient is a 79 yr old female who is a LTC  resident here at [facility]. The patient transitioned here to the facility after a skilled visit in Jan of 2021.   PMH of CAD, status post PTCA, hyperlipidemia, COPD, anxiety and depression, hypertension, TIA, and dementia.  The resident was under Hospice care for a few months in 2023 and this was cancelled in July 2023.  Nursing reports that the family of Qing would like to have Hospice consulted again as she is declining in her dementia and physical ability.   Qing is awake resting in bed. She appears to be in no acute distress. Due to her cognition, I am not able to have a meaningful conversation.     REVIEW OF SYSTEMS:   All review of systems are negative unless otherwise stated above under subjective.    RX Allergies[1]    MEDICATIONS REVIEWED AT FACILITY:  Please see nursing facility medication list.    Living will related issues reviewed-Code status: DNRCC    OBJECTIVE:  /52   Wt 60.8 kg (134 lb)   BMI 23.00 kg/m²   Physical Exam  Constitutional:       Appearance: chronically ill appearing. She is normal weight.   HENT:      Head: Normocephalic.      Mouth: Mucous membranes are moist.   Eyes:      Extraocular Movements: Extraocular movements intact.      Conjunctiva/sclera: Conjunctivae normal.      Pupils: Pupils are equal, round, and reactive to light.   Cardiovascular:      Rate and Rhythm: Normal rate and regular rhythm.      Pulses: Normal pulses.      Heart sounds: Normal heart sounds.   Pulmonary:      Effort: Pulmonary effort is normal.      Breath sounds: Normal breath sounds.   Abdominal:      General: Bowel sounds are normal. There is no distension.      Palpations: Abdomen is soft.      Tenderness: There is no abdominal tenderness.   Genitourinary:     Comments: Not examined  Musculoskeletal:         General: Tenderness present. Normal range of motion.      Cervical back: Normal range of motion and neck supple.      Comments: Generalized weakness, lower legs tight but can be extended  out straight  Skin:     General: Skin is warm and dry. R lateral malleolus wound  Capillary Refill: Capillary refill takes less than 2 seconds.   Neurological:      General: No focal deficit present.      Mental Status: She is alert. Mental status is at baseline.      Comments: Oriented x1   Psychiatric:      Comments: demented         Discharge planning:   No plan for discharge, LTC resident, rehab potential    *Please note that nursing facility notes, facility EMR, outside laboratory agency, and  EMR do not interface.     Completion of the note was done through Dragon voice recognition technology and may include unintended or grammatical errors which may not have been recognized when finalizing the note.     Time:    MORE Madison        Electronically Signed By: MORE Madison   4/30/25  3:07 PM       [1]  Allergies  Allergen Reactions   • Fenofibrate Anaphylaxis   • Fenofibric Acid Anaphylaxis   • Fenofibric Acid (Choline) Anaphylaxis   • Levofloxacin Anaphylaxis and Unknown   • Penicillins Anaphylaxis, Hives and Unknown

## 2025-04-25 ENCOUNTER — NURSING HOME VISIT (OUTPATIENT)
Dept: POST ACUTE CARE | Facility: EXTERNAL LOCATION | Age: 80
End: 2025-04-25
Payer: MEDICARE

## 2025-04-25 DIAGNOSIS — M79.604 PAIN IN BOTH LOWER EXTREMITIES: ICD-10-CM

## 2025-04-25 DIAGNOSIS — M19.90 OSTEOARTHRITIS, UNSPECIFIED OSTEOARTHRITIS TYPE, UNSPECIFIED SITE: ICD-10-CM

## 2025-04-25 DIAGNOSIS — L08.9 WOUND INFECTION: ICD-10-CM

## 2025-04-25 DIAGNOSIS — F01.53 VASCULAR DEMENTIA WITH DEPRESSED MOOD: Primary | ICD-10-CM

## 2025-04-25 DIAGNOSIS — F41.9 ANXIETY AND DEPRESSION: ICD-10-CM

## 2025-04-25 DIAGNOSIS — T14.8XXA WOUND INFECTION: ICD-10-CM

## 2025-04-25 DIAGNOSIS — M79.605 PAIN IN BOTH LOWER EXTREMITIES: ICD-10-CM

## 2025-04-25 DIAGNOSIS — F32.A ANXIETY AND DEPRESSION: ICD-10-CM

## 2025-04-25 PROCEDURE — 99309 SBSQ NF CARE MODERATE MDM 30: CPT | Performed by: NURSE PRACTITIONER

## 2025-04-25 NOTE — LETTER
Patient: Eli Tay  : 1945    Encounter Date: 2025    Name: Eli Tay  YOB: 1945    ACUTE VISIT: Long term care resident and Hospice care    SUBJECTIVE:  Eli Tay is a 79 y.o. female who is a LTC resident here at .   Nursing reported wound to left great toe. Culture was done on  + staph and Doxy 100 mg BID x 10 days initiated.  The patient is resting comfortably in bed without signs of distress.   Pt is confused and not able to have meaningful conversation.    REVIEW OF SYSTEMS:   All review of systems are negative unless otherwise stated above under subjective.    RX Allergies[1]    MEDICATIONS REVIEWED AT FACILITY:  Please see nursing facility medication list.    Living will related issues reviewed-Code status: DNRCC    OBJECTIVE:  /70   Pulse 77   Physical Exam  Constitutional:       Appearance: chronically ill appearing. She is normal weight.   HENT:      Head: Normocephalic.      Mouth: Mucous membranes are moist.   Eyes:      Extraocular Movements: Extraocular movements intact.      Conjunctiva/sclera: Conjunctivae normal.      Pupils: Pupils are equal, round, and reactive to light.   Cardiovascular:      Rate and Rhythm: Normal rate and regular rhythm.      Pulses: Normal pulses.      Heart sounds: Normal heart sounds.   Pulmonary:      Effort: Pulmonary effort is normal.      Breath sounds: Normal breath sounds.   Abdominal:      General: Bowel sounds are normal. There is no distension.      Palpations: Abdomen is soft.      Tenderness: There is no abdominal tenderness.   Genitourinary:     Comments: Not examined  Musculoskeletal:         General: Tenderness present. Normal range of motion.      Cervical back: Normal range of motion and neck supple.      Comments: Generalized weakness, lower legs tight but can be extended out straight  Skin:     General: Skin is warm and dry. R lateral malleolus wound  Capillary Refill: Capillary refill  takes less than 2 seconds.   Neurological:      General: No focal deficit present.      Mental Status: She is alert. Mental status is at baseline.      Comments: Oriented x1   Psychiatric:      Comments: demented     Assessment/Plan  Problem List Items Addressed This Visit       Anxiety and depression    Vascular dementia with depressed mood - Primary    Osteoarthritis     Other Visit Diagnoses         Wound infection          Pain in both lower extremities                PLAN:  Pt has been seen for an Acute visit.  The patient resides in a Long term care facility.  Recent nursing evaluation and notes were reviewed.     Wound + staph, Hospice:  Hospice is for patients with serious or terminal illnesses who are no longer being treated to cure the disease.   Wound to right great toe + for Staph and Doxy started on 4/23 x 10 days.    The goal of hospice care is to keep the patient comfortable.   Decisions about medications are made by the Facility Physician, APRN, and Hospice team collaboratively with the patient and family.  Medications for chronic illnesses are not normally continued long term as medications could cause harm or are no longer benefit the patient to maintain comfort.   Patients and family can request for certain medications to be continued if the physician, APRN, and Hospice team is in agreement to continue these medications as the goal of Hospice care is to provide comfort.   Medications that are continued or initiated are medications for pain, antiemetics, antidepressants, antianxiety, oxygen and/or medications for respiratory symptoms.   Labs and diagnostic testing are not routinely done while the patient is on Hospice care but can be ordered per the discretion of the physician, APRN, and Hospice team if absolutely needed.     Dementia, depression, insomnia:  Continue to monitor mood and behaviors.  Mood: calm   Behavior: yelling out at times  Monitor Weights:  1/2/25: 128.9# (stable)  4/1/25:  134#  Psych following.  Continue with medications:  Trazodone 50 mg nightly  Cymbalta 60 mg daily  Redirect as needed.  Provide safe environment and continue frequent monitoring.  Resident resides in a locked unit.     HTN, CAD, HLD:   Monitor and follow BP readings.   Continue Medications:  No meds  Labs to be done intermittently and adjust meds as needed.  BP readings reviewed:  120/70s  Avoid nephrotoxic drugs.     COPD:  Monitor resp status - stable 96% on RA  Oxygen as needed to maintain SPO2 92% or better  Albuterol nebulizer every 6 hours as needed     Osteoarthritis/generalized discomfort:  Tylenol 650 mg daily  Oxycodone 5 mg BID PRN  Tylenol 650 mg every 4 hours as needed  Lidocaine patch to lower back topically 12 hours on and 12 hours off as needed     Wound Care :  Wound care per nursing  R lateral malleolus  Referral to Wound clinic or Wound Nurse Practitioner if appropriate.     Skin integrity:  Nursing will continue to monitor skin integrity as patient is at risk for pressure injuries.  Turn and reposition Q2 hours or more.  Air mattress and when up in chair cushion reducing device.  Dietician to evaluate and recommend.  Labs done intermittently and followed.  Nutritional supplement to be implemented if needed.  Please monitor skin integrity and other pressure areas.    Any decline or change in condition needs to be communicated with the physician or myself.    Discussion with nursing staff regarding ongoing care and management.  Communication regarding patients status, overall condition, changes with plan (medications or treatments), and any questions from family completed if present.  If family not available, would communicate in person or via phone if needed.  We will continue with the medications noted above.    We will continue to follow the patient here at the facility.    Discharge planning:   No plan for discharge, LTC resident, rehab potential    *Please note that nursing facility notes,  facility EMR, outside laboratory agency, and  EMR do not interface.     Completion of the note was done through Dragon voice recognition technology and may include unintended or grammatical errors which may not have been recognized when finalizing the note.     Time:    MORE Madison        Electronically Signed By: MORE Madison   6/3/25  9:06 PM       [1]  Allergies  Allergen Reactions   • Fenofibrate Anaphylaxis   • Fenofibric Acid Anaphylaxis   • Fenofibric Acid (Choline) Anaphylaxis   • Levofloxacin Anaphylaxis and Unknown   • Penicillins Anaphylaxis, Hives and Unknown

## 2025-04-30 VITALS — WEIGHT: 134 LBS | DIASTOLIC BLOOD PRESSURE: 52 MMHG | BODY MASS INDEX: 23 KG/M2 | SYSTOLIC BLOOD PRESSURE: 116 MMHG

## 2025-04-30 NOTE — PROGRESS NOTES
Name: Eli Tay  YOB: 1945    ACUTE VISIT: Long term care resident, family requesting Hospice    Assessment/Plan   Problem List Items Addressed This Visit       Anxiety and depression    Vascular dementia with depressed mood - Primary    Chronic obstructive pulmonary disease (Multi)    HTN (hypertension), benign    Osteoarthritis     Other Visit Diagnoses         Pain in both lower extremities          Other chronic pain          Pressure injury of skin of right heel, unspecified injury stage                PLAN:  Pt has been seen for an Acute visit.  The patient resides in a Long term care facility.  Recent nursing evaluation and notes were reviewed.     Referral for Hospice:  Hospice is for patients with serious or terminal illnesses who are no longer being treated to cure the disease.   The goal of hospice care is to keep the patient comfortable.   Decisions about medications are made by the Facility Physician, APRN, and Hospice team collaboratively with the patient and family.  Medications for chronic illnesses are not normally continued long term as medications could cause harm or are no longer benefit the patient to maintain comfort.   Patients and family can request for certain medications to be continued if the physician, APRN, and Hospice team is in agreement to continue these medications as the goal of Hospice care is to provide comfort.   Medications that are continued or initiated are medications for pain, antiemetics, antidepressants, antianxiety, oxygen and/or medications for respiratory symptoms.   Labs and diagnostic testing are not routinely done while the patient is on Hospice care but can be ordered per the discretion of the physician, APRN, and Hospice team if absolutely needed.    Dementia, depression, insomnia:  Continue to monitor mood and behaviors.  Mood: calm   Behavior: yelling out at times  Monitor Weights:  1/2/25: 128.9# (stable)  4/1/25: 134#  Psych  following.  Continue with medications:  Trazodone 50 mg nightly  Cymbalta 60 mg daily  Redirect as needed.  Provide safe environment and continue frequent monitoring.  Resident resides in a locked unit.     HTN, CAD, HLD:   Monitor and follow BP readings.   Continue Medications:  No meds  Labs to be done intermittently and adjust meds as needed.  BP readings reviewed:  116/52  Avoid nephrotoxic drugs.     COPD:  Monitor resp status - stable 96% on RA  Oxygen as needed to maintain SPO2 92% or better  Albuterol nebulizer every 6 hours as needed     Osteoarthritis/generalized discomfort:  Tylenol 650 mg daily  Oxycodone 5 mg BID PRN  Tylenol 650 mg every 4 hours as needed  Lidocaine patch to lower back topically 12 hours on and 12 hours off as needed    Skin integrity:  Nursing will continue to monitor skin integrity as patient is at risk for pressure injuries.  Turn and reposition Q2 hours or more.  Air mattress and when up in chair cushion reducing device.  Dietician to evaluate and recommend.  Labs done intermittently and followed.  Nutritional supplement to be implemented if needed.  Please monitor skin integrity and other pressure areas.    Wound Care :  Wound care per nursing  R lateral malleolus  Referral to Wound clinic or Wound Nurse Practitioner if appropriate.      Any decline or change in condition needs to be communicated with the physician or myself.    Discussion with nursing staff regarding ongoing care and management.  Communication regarding patients status, overall condition, changes with plan (medications or treatments), and any questions from family completed if present.  If family not available, would communicate in person or via phone if needed.  We will continue with the medications noted above.    We will continue to follow the patient here at the facility.    SUBJECTIVE:  The patient is a 79 yr old female who is a LTC resident here at [facility]. The patient transitioned here to the  facility after a skilled visit in Jan of 2021.   PMH of CAD, status post PTCA, hyperlipidemia, COPD, anxiety and depression, hypertension, TIA, and dementia.  The resident was under Hospice care for a few months in 2023 and this was cancelled in July 2023.  Nursing reports that the family of Qing would like to have Hospice consulted again as she is declining in her dementia and physical ability.   Qing is awake resting in bed. She appears to be in no acute distress. Due to her cognition, I am not able to have a meaningful conversation.     REVIEW OF SYSTEMS:   All review of systems are negative unless otherwise stated above under subjective.    RX Allergies[1]    MEDICATIONS REVIEWED AT FACILITY:  Please see nursing facility medication list.    Living will related issues reviewed-Code status: DNRCC    OBJECTIVE:  /52   Wt 60.8 kg (134 lb)   BMI 23.00 kg/m²   Physical Exam  Constitutional:       Appearance: chronically ill appearing. She is normal weight.   HENT:      Head: Normocephalic.      Mouth: Mucous membranes are moist.   Eyes:      Extraocular Movements: Extraocular movements intact.      Conjunctiva/sclera: Conjunctivae normal.      Pupils: Pupils are equal, round, and reactive to light.   Cardiovascular:      Rate and Rhythm: Normal rate and regular rhythm.      Pulses: Normal pulses.      Heart sounds: Normal heart sounds.   Pulmonary:      Effort: Pulmonary effort is normal.      Breath sounds: Normal breath sounds.   Abdominal:      General: Bowel sounds are normal. There is no distension.      Palpations: Abdomen is soft.      Tenderness: There is no abdominal tenderness.   Genitourinary:     Comments: Not examined  Musculoskeletal:         General: Tenderness present. Normal range of motion.      Cervical back: Normal range of motion and neck supple.      Comments: Generalized weakness, lower legs tight but can be extended out straight  Skin:     General: Skin is warm and dry. R lateral  malleolus wound  Capillary Refill: Capillary refill takes less than 2 seconds.   Neurological:      General: No focal deficit present.      Mental Status: She is alert. Mental status is at baseline.      Comments: Oriented x1   Psychiatric:      Comments: demented         Discharge planning:   No plan for discharge, LTC resident, rehab potential    *Please note that nursing facility notes, facility EMR, outside laboratory agency, and  EMR do not interface.     Completion of the note was done through Dragon voice recognition technology and may include unintended or grammatical errors which may not have been recognized when finalizing the note.     Time:    Luisa Rivas, APRN-CNP         [1]   Allergies  Allergen Reactions    Fenofibrate Anaphylaxis    Fenofibric Acid Anaphylaxis    Fenofibric Acid (Choline) Anaphylaxis    Levofloxacin Anaphylaxis and Unknown    Penicillins Anaphylaxis, Hives and Unknown

## 2025-06-03 VITALS — DIASTOLIC BLOOD PRESSURE: 70 MMHG | SYSTOLIC BLOOD PRESSURE: 121 MMHG | HEART RATE: 77 BPM

## 2025-06-04 NOTE — PROGRESS NOTES
Name: Eli Tay  YOB: 1945    ACUTE VISIT: Long term care resident and Hospice care    SUBJECTIVE:  Eli Tay is a 79 y.o. female who is a LTC resident here at .   Nursing reported wound to left great toe. Culture was done on 4/23 + staph and Doxy 100 mg BID x 10 days initiated.  The patient is resting comfortably in bed without signs of distress.   Pt is confused and not able to have meaningful conversation.    REVIEW OF SYSTEMS:   All review of systems are negative unless otherwise stated above under subjective.    RX Allergies[1]    MEDICATIONS REVIEWED AT FACILITY:  Please see nursing facility medication list.    Living will related issues reviewed-Code status: DNRCC    OBJECTIVE:  /70   Pulse 77   Physical Exam  Constitutional:       Appearance: chronically ill appearing. She is normal weight.   HENT:      Head: Normocephalic.      Mouth: Mucous membranes are moist.   Eyes:      Extraocular Movements: Extraocular movements intact.      Conjunctiva/sclera: Conjunctivae normal.      Pupils: Pupils are equal, round, and reactive to light.   Cardiovascular:      Rate and Rhythm: Normal rate and regular rhythm.      Pulses: Normal pulses.      Heart sounds: Normal heart sounds.   Pulmonary:      Effort: Pulmonary effort is normal.      Breath sounds: Normal breath sounds.   Abdominal:      General: Bowel sounds are normal. There is no distension.      Palpations: Abdomen is soft.      Tenderness: There is no abdominal tenderness.   Genitourinary:     Comments: Not examined  Musculoskeletal:         General: Tenderness present. Normal range of motion.      Cervical back: Normal range of motion and neck supple.      Comments: Generalized weakness, lower legs tight but can be extended out straight  Skin:     General: Skin is warm and dry. R lateral malleolus wound  Capillary Refill: Capillary refill takes less than 2 seconds.   Neurological:      General: No focal deficit  present.      Mental Status: She is alert. Mental status is at baseline.      Comments: Oriented x1   Psychiatric:      Comments: demented     Assessment/Plan   Problem List Items Addressed This Visit       Anxiety and depression    Vascular dementia with depressed mood - Primary    Osteoarthritis     Other Visit Diagnoses         Wound infection          Pain in both lower extremities                PLAN:  Pt has been seen for an Acute visit.  The patient resides in a Long term care facility.  Recent nursing evaluation and notes were reviewed.     Wound + staph, Hospice:  Hospice is for patients with serious or terminal illnesses who are no longer being treated to cure the disease.   Wound to right great toe + for Staph and Doxy started on 4/23 x 10 days.    The goal of hospice care is to keep the patient comfortable.   Decisions about medications are made by the Facility Physician, APRN, and Hospice team collaboratively with the patient and family.  Medications for chronic illnesses are not normally continued long term as medications could cause harm or are no longer benefit the patient to maintain comfort.   Patients and family can request for certain medications to be continued if the physician, APRN, and Hospice team is in agreement to continue these medications as the goal of Hospice care is to provide comfort.   Medications that are continued or initiated are medications for pain, antiemetics, antidepressants, antianxiety, oxygen and/or medications for respiratory symptoms.   Labs and diagnostic testing are not routinely done while the patient is on Hospice care but can be ordered per the discretion of the physician, APRN, and Hospice team if absolutely needed.     Dementia, depression, insomnia:  Continue to monitor mood and behaviors.  Mood: calm   Behavior: yelling out at times  Monitor Weights:  1/2/25: 128.9# (stable)  4/1/25: 134#  Psych following.  Continue with medications:  Trazodone 50 mg  nightly  Cymbalta 60 mg daily  Redirect as needed.  Provide safe environment and continue frequent monitoring.  Resident resides in a locked unit.     HTN, CAD, HLD:   Monitor and follow BP readings.   Continue Medications:  No meds  Labs to be done intermittently and adjust meds as needed.  BP readings reviewed:  120/70s  Avoid nephrotoxic drugs.     COPD:  Monitor resp status - stable 96% on RA  Oxygen as needed to maintain SPO2 92% or better  Albuterol nebulizer every 6 hours as needed     Osteoarthritis/generalized discomfort:  Tylenol 650 mg daily  Oxycodone 5 mg BID PRN  Tylenol 650 mg every 4 hours as needed  Lidocaine patch to lower back topically 12 hours on and 12 hours off as needed     Wound Care :  Wound care per nursing  R lateral malleolus  Referral to Wound clinic or Wound Nurse Practitioner if appropriate.     Skin integrity:  Nursing will continue to monitor skin integrity as patient is at risk for pressure injuries.  Turn and reposition Q2 hours or more.  Air mattress and when up in chair cushion reducing device.  Dietician to evaluate and recommend.  Labs done intermittently and followed.  Nutritional supplement to be implemented if needed.  Please monitor skin integrity and other pressure areas.    Any decline or change in condition needs to be communicated with the physician or myself.    Discussion with nursing staff regarding ongoing care and management.  Communication regarding patients status, overall condition, changes with plan (medications or treatments), and any questions from family completed if present.  If family not available, would communicate in person or via phone if needed.  We will continue with the medications noted above.    We will continue to follow the patient here at the facility.    Discharge planning:   No plan for discharge, LTC resident, rehab potential    *Please note that nursing facility notes, facility EMR, outside laboratory agency, and  EMR do not interface.      Completion of the note was done through Dragon voice recognition technology and may include unintended or grammatical errors which may not have been recognized when finalizing the note.     Time:    Luisa Rivas, APRN-CNP         [1]   Allergies  Allergen Reactions    Fenofibrate Anaphylaxis    Fenofibric Acid Anaphylaxis    Fenofibric Acid (Choline) Anaphylaxis    Levofloxacin Anaphylaxis and Unknown    Penicillins Anaphylaxis, Hives and Unknown